# Patient Record
Sex: FEMALE | Race: WHITE | Employment: OTHER | URBAN - METROPOLITAN AREA
[De-identification: names, ages, dates, MRNs, and addresses within clinical notes are randomized per-mention and may not be internally consistent; named-entity substitution may affect disease eponyms.]

---

## 2017-03-27 ENCOUNTER — EMPLOYEE WELLNESS (OUTPATIENT)
Dept: OTHER | Age: 57
End: 2017-03-27

## 2018-03-20 VITALS — BODY MASS INDEX: 33.12 KG/M2 | WEIGHT: 199 LBS

## 2018-04-15 ENCOUNTER — HOSPITAL ENCOUNTER (EMERGENCY)
Age: 58
Discharge: HOME | End: 2018-04-15
Payer: COMMERCIAL

## 2018-04-15 ENCOUNTER — APPOINTMENT (OUTPATIENT)
Dept: GENERAL RADIOLOGY | Age: 58
End: 2018-04-15
Payer: COMMERCIAL

## 2018-04-15 VITALS
HEART RATE: 91 BPM | TEMPERATURE: 97.9 F | RESPIRATION RATE: 16 BRPM | DIASTOLIC BLOOD PRESSURE: 86 MMHG | WEIGHT: 210 LBS | SYSTOLIC BLOOD PRESSURE: 130 MMHG | OXYGEN SATURATION: 98 % | BODY MASS INDEX: 34.95 KG/M2

## 2018-04-15 DIAGNOSIS — G89.29 CHRONIC PAIN OF RIGHT KNEE: Primary | ICD-10-CM

## 2018-04-15 DIAGNOSIS — M25.561 CHRONIC PAIN OF RIGHT KNEE: Primary | ICD-10-CM

## 2018-04-15 DIAGNOSIS — M25.461 KNEE EFFUSION, RIGHT: ICD-10-CM

## 2018-04-15 PROCEDURE — 73564 X-RAY EXAM KNEE 4 OR MORE: CPT

## 2018-04-15 PROCEDURE — 73562 X-RAY EXAM OF KNEE 3: CPT

## 2018-04-15 PROCEDURE — 99212 OFFICE O/P EST SF 10 MIN: CPT

## 2018-04-17 ENCOUNTER — EMPLOYEE WELLNESS (OUTPATIENT)
Dept: OTHER | Age: 58
End: 2018-04-17

## 2018-04-17 LAB
CHOLESTEROL, TOTAL: 205 MG/DL (ref 0–199)
GLUCOSE BLD-MCNC: 98 MG/DL (ref 74–107)
HDLC SERPL-MCNC: 52 MG/DL
LDL CHOLESTEROL CALCULATED: 129 MG/DL (ref 0–99)
TRIGL SERPL-MCNC: 120 MG/DL (ref 0–149)

## 2018-04-23 VITALS — WEIGHT: 216 LBS | BODY MASS INDEX: 35.94 KG/M2

## 2018-04-26 ENCOUNTER — OFFICE VISIT (OUTPATIENT)
Dept: ORTHOPEDIC SURGERY | Age: 58
End: 2018-04-26
Payer: COMMERCIAL

## 2018-04-26 VITALS — HEIGHT: 65 IN | BODY MASS INDEX: 34.99 KG/M2 | TEMPERATURE: 97.9 F | WEIGHT: 210 LBS

## 2018-04-26 DIAGNOSIS — M17.11 PRIMARY OSTEOARTHRITIS OF RIGHT KNEE: Primary | ICD-10-CM

## 2018-04-26 PROCEDURE — 99204 OFFICE O/P NEW MOD 45 MIN: CPT | Performed by: ORTHOPAEDIC SURGERY

## 2018-04-26 RX ORDER — NAPROXEN AND ESOMEPRAZOLE MAGNESIUM 500; 20 MG/1; MG/1
1 TABLET, DELAYED RELEASE ORAL 2 TIMES DAILY
Qty: 60 TABLET | Refills: 3 | Status: ON HOLD | OUTPATIENT
Start: 2018-04-26 | End: 2018-05-02 | Stop reason: HOSPADM

## 2018-05-01 ENCOUNTER — HOSPITAL ENCOUNTER (INPATIENT)
Age: 58
LOS: 2 days | Discharge: HOME | DRG: 175 | End: 2018-05-03
Attending: EMERGENCY MEDICINE | Admitting: HOSPITALIST
Payer: COMMERCIAL

## 2018-05-01 ENCOUNTER — HOSPITAL ENCOUNTER (EMERGENCY)
Age: 58
Discharge: OTHER INSTITUTION | DRG: 175 | End: 2018-05-01
Payer: COMMERCIAL

## 2018-05-01 ENCOUNTER — APPOINTMENT (OUTPATIENT)
Dept: CT IMAGING | Age: 58
DRG: 175 | End: 2018-05-01
Payer: COMMERCIAL

## 2018-05-01 ENCOUNTER — APPOINTMENT (OUTPATIENT)
Dept: GENERAL RADIOLOGY | Age: 58
DRG: 175 | End: 2018-05-01
Payer: COMMERCIAL

## 2018-05-01 VITALS
BODY MASS INDEX: 34.61 KG/M2 | HEART RATE: 112 BPM | OXYGEN SATURATION: 97 % | RESPIRATION RATE: 20 BRPM | WEIGHT: 208 LBS | TEMPERATURE: 98.2 F | SYSTOLIC BLOOD PRESSURE: 111 MMHG | DIASTOLIC BLOOD PRESSURE: 82 MMHG

## 2018-05-01 DIAGNOSIS — I26.99 OTHER PULMONARY EMBOLISM WITHOUT ACUTE COR PULMONALE, UNSPECIFIED CHRONICITY (HCC): Primary | ICD-10-CM

## 2018-05-01 DIAGNOSIS — I82.431 ACUTE DEEP VEIN THROMBOSIS (DVT) OF POPLITEAL VEIN OF RIGHT LOWER EXTREMITY (HCC): ICD-10-CM

## 2018-05-01 DIAGNOSIS — R06.00 DYSPNEA, UNSPECIFIED TYPE: Primary | ICD-10-CM

## 2018-05-01 LAB
ANION GAP SERPL CALCULATED.3IONS-SCNC: 13 MMOL/L (ref 7–16)
BASOPHILS ABSOLUTE: 0.03 E9/L (ref 0–0.2)
BASOPHILS RELATIVE PERCENT: 0.4 % (ref 0–2)
BUN BLDV-MCNC: 13 MG/DL (ref 6–20)
CALCIUM SERPL-MCNC: 9.6 MG/DL (ref 8.6–10.2)
CHLORIDE BLD-SCNC: 103 MMOL/L (ref 98–107)
CO2: 23 MMOL/L (ref 22–29)
CREAT SERPL-MCNC: 0.8 MG/DL (ref 0.5–1)
D DIMER: 2793 NG/ML DDU
EOSINOPHILS ABSOLUTE: 0.07 E9/L (ref 0.05–0.5)
EOSINOPHILS RELATIVE PERCENT: 0.9 % (ref 0–6)
GFR AFRICAN AMERICAN: >60
GFR NON-AFRICAN AMERICAN: >60 ML/MIN/1.73
GLUCOSE BLD-MCNC: 114 MG/DL (ref 74–109)
HCT VFR BLD CALC: 38.8 % (ref 34–48)
HEMOGLOBIN: 12.9 G/DL (ref 11.5–15.5)
IMMATURE GRANULOCYTES #: 0.02 E9/L
IMMATURE GRANULOCYTES %: 0.2 % (ref 0–5)
LYMPHOCYTES ABSOLUTE: 1.78 E9/L (ref 1.5–4)
LYMPHOCYTES RELATIVE PERCENT: 22.2 % (ref 20–42)
MCH RBC QN AUTO: 30 PG (ref 26–35)
MCHC RBC AUTO-ENTMCNC: 33.2 % (ref 32–34.5)
MCV RBC AUTO: 90.2 FL (ref 80–99.9)
MONOCYTES ABSOLUTE: 0.69 E9/L (ref 0.1–0.95)
MONOCYTES RELATIVE PERCENT: 8.6 % (ref 2–12)
NEUTROPHILS ABSOLUTE: 5.42 E9/L (ref 1.8–7.3)
NEUTROPHILS RELATIVE PERCENT: 67.7 % (ref 43–80)
PDW BLD-RTO: 12.5 FL (ref 11.5–15)
PLATELET # BLD: 240 E9/L (ref 130–450)
PMV BLD AUTO: 10.4 FL (ref 7–12)
POTASSIUM SERPL-SCNC: 3.9 MMOL/L (ref 3.5–5)
PRO-BNP: 3163 PG/ML (ref 0–125)
RBC # BLD: 4.3 E12/L (ref 3.5–5.5)
SODIUM BLD-SCNC: 139 MMOL/L (ref 132–146)
TROPONIN: <0.01 NG/ML (ref 0–0.03)
WBC # BLD: 8 E9/L (ref 4.5–11.5)

## 2018-05-01 PROCEDURE — 6360000002 HC RX W HCPCS: Performed by: HOSPITALIST

## 2018-05-01 PROCEDURE — 71046 X-RAY EXAM CHEST 2 VIEWS: CPT

## 2018-05-01 PROCEDURE — 2060000000 HC ICU INTERMEDIATE R&B

## 2018-05-01 PROCEDURE — 84443 ASSAY THYROID STIM HORMONE: CPT

## 2018-05-01 PROCEDURE — 83880 ASSAY OF NATRIURETIC PEPTIDE: CPT

## 2018-05-01 PROCEDURE — 85025 COMPLETE CBC W/AUTO DIFF WBC: CPT

## 2018-05-01 PROCEDURE — 80048 BASIC METABOLIC PNL TOTAL CA: CPT

## 2018-05-01 PROCEDURE — 2580000003 HC RX 258: Performed by: HOSPITALIST

## 2018-05-01 PROCEDURE — 6360000002 HC RX W HCPCS: Performed by: EMERGENCY MEDICINE

## 2018-05-01 PROCEDURE — 6360000004 HC RX CONTRAST MEDICATION: Performed by: RADIOLOGY

## 2018-05-01 PROCEDURE — 99212 OFFICE O/P EST SF 10 MIN: CPT

## 2018-05-01 PROCEDURE — 84484 ASSAY OF TROPONIN QUANT: CPT

## 2018-05-01 PROCEDURE — 36415 COLL VENOUS BLD VENIPUNCTURE: CPT

## 2018-05-01 PROCEDURE — 85378 FIBRIN DEGRADE SEMIQUANT: CPT

## 2018-05-01 PROCEDURE — 71275 CT ANGIOGRAPHY CHEST: CPT

## 2018-05-01 PROCEDURE — 99285 EMERGENCY DEPT VISIT HI MDM: CPT

## 2018-05-01 RX ORDER — ONDANSETRON 2 MG/ML
4 INJECTION INTRAMUSCULAR; INTRAVENOUS EVERY 6 HOURS PRN
Status: DISCONTINUED | OUTPATIENT
Start: 2018-05-01 | End: 2018-05-03 | Stop reason: HOSPADM

## 2018-05-01 RX ORDER — SODIUM CHLORIDE 9 MG/ML
INJECTION, SOLUTION INTRAVENOUS CONTINUOUS
Status: DISCONTINUED | OUTPATIENT
Start: 2018-05-01 | End: 2018-05-02

## 2018-05-01 RX ORDER — SODIUM CHLORIDE 0.9 % (FLUSH) 0.9 %
10 SYRINGE (ML) INJECTION EVERY 12 HOURS SCHEDULED
Status: DISCONTINUED | OUTPATIENT
Start: 2018-05-01 | End: 2018-05-03 | Stop reason: HOSPADM

## 2018-05-01 RX ORDER — SODIUM CHLORIDE 0.9 % (FLUSH) 0.9 %
10 SYRINGE (ML) INJECTION PRN
Status: DISCONTINUED | OUTPATIENT
Start: 2018-05-01 | End: 2018-05-03 | Stop reason: HOSPADM

## 2018-05-01 RX ADMIN — ENOXAPARIN SODIUM 90 MG: 100 INJECTION SUBCUTANEOUS at 21:02

## 2018-05-01 RX ADMIN — Medication 10 ML: at 21:04

## 2018-05-01 RX ADMIN — ENOXAPARIN SODIUM 90 MG: 100 INJECTION SUBCUTANEOUS at 17:58

## 2018-05-01 RX ADMIN — IOPAMIDOL 80 ML: 755 INJECTION, SOLUTION INTRAVENOUS at 16:57

## 2018-05-01 RX ADMIN — SODIUM CHLORIDE: 9 INJECTION, SOLUTION INTRAVENOUS at 19:32

## 2018-05-01 ASSESSMENT — ENCOUNTER SYMPTOMS
COUGH: 0
SHORTNESS OF BREATH: 1
BACK PAIN: 0
WHEEZING: 0
ABDOMINAL PAIN: 0
DIARRHEA: 0
SPUTUM PRODUCTION: 0
VOMITING: 0
HEMOPTYSIS: 0
RHINORRHEA: 0

## 2018-05-02 ENCOUNTER — APPOINTMENT (OUTPATIENT)
Dept: INTERVENTIONAL RADIOLOGY/VASCULAR | Age: 58
DRG: 175 | End: 2018-05-02
Payer: COMMERCIAL

## 2018-05-02 PROBLEM — I82.431 DEEP VEIN THROMBOSIS (DVT) OF POPLITEAL VEIN OF RIGHT LOWER EXTREMITY (HCC): Status: ACTIVE | Noted: 2018-05-02

## 2018-05-02 LAB
ANION GAP SERPL CALCULATED.3IONS-SCNC: 14 MMOL/L (ref 7–16)
BUN BLDV-MCNC: 10 MG/DL (ref 6–20)
CALCIUM SERPL-MCNC: 8.8 MG/DL (ref 8.6–10.2)
CHLORIDE BLD-SCNC: 106 MMOL/L (ref 98–107)
CO2: 21 MMOL/L (ref 22–29)
CREAT SERPL-MCNC: 0.7 MG/DL (ref 0.5–1)
GFR AFRICAN AMERICAN: >60
GFR NON-AFRICAN AMERICAN: >60 ML/MIN/1.73
GLUCOSE BLD-MCNC: 95 MG/DL (ref 74–109)
HCT VFR BLD CALC: 36.9 % (ref 34–48)
HEMOGLOBIN: 12.3 G/DL (ref 11.5–15.5)
LV EF: 70 %
LVEF MODALITY: NORMAL
MAGNESIUM: 2.2 MG/DL (ref 1.6–2.6)
MCH RBC QN AUTO: 30.1 PG (ref 26–35)
MCHC RBC AUTO-ENTMCNC: 33.3 % (ref 32–34.5)
MCV RBC AUTO: 90.2 FL (ref 80–99.9)
PDW BLD-RTO: 12.6 FL (ref 11.5–15)
PLATELET # BLD: 245 E9/L (ref 130–450)
PMV BLD AUTO: 10.9 FL (ref 7–12)
POTASSIUM REFLEX MAGNESIUM: 3.5 MMOL/L (ref 3.5–5)
RBC # BLD: 4.09 E12/L (ref 3.5–5.5)
SODIUM BLD-SCNC: 141 MMOL/L (ref 132–146)
WBC # BLD: 7.9 E9/L (ref 4.5–11.5)

## 2018-05-02 PROCEDURE — 83516 IMMUNOASSAY NONANTIBODY: CPT

## 2018-05-02 PROCEDURE — 81240 F2 GENE: CPT

## 2018-05-02 PROCEDURE — 6360000002 HC RX W HCPCS: Performed by: HOSPITALIST

## 2018-05-02 PROCEDURE — 81241 F5 GENE: CPT

## 2018-05-02 PROCEDURE — 81291 MTHFR GENE: CPT

## 2018-05-02 PROCEDURE — 93306 TTE W/DOPPLER COMPLETE: CPT

## 2018-05-02 PROCEDURE — 2060000000 HC ICU INTERMEDIATE R&B

## 2018-05-02 PROCEDURE — 83090 ASSAY OF HOMOCYSTEINE: CPT

## 2018-05-02 PROCEDURE — 83735 ASSAY OF MAGNESIUM: CPT

## 2018-05-02 PROCEDURE — 2580000003 HC RX 258: Performed by: HOSPITALIST

## 2018-05-02 PROCEDURE — 36415 COLL VENOUS BLD VENIPUNCTURE: CPT

## 2018-05-02 PROCEDURE — 80048 BASIC METABOLIC PNL TOTAL CA: CPT

## 2018-05-02 PROCEDURE — 85027 COMPLETE CBC AUTOMATED: CPT

## 2018-05-02 PROCEDURE — 86038 ANTINUCLEAR ANTIBODIES: CPT

## 2018-05-02 PROCEDURE — 81400 MOPATH PROCEDURE LEVEL 1: CPT

## 2018-05-02 PROCEDURE — 85613 RUSSELL VIPER VENOM DILUTED: CPT

## 2018-05-02 PROCEDURE — 93970 EXTREMITY STUDY: CPT

## 2018-05-02 RX ADMIN — ENOXAPARIN SODIUM 90 MG: 100 INJECTION SUBCUTANEOUS at 08:45

## 2018-05-02 RX ADMIN — Medication 10 ML: at 20:18

## 2018-05-02 RX ADMIN — SODIUM CHLORIDE: 9 INJECTION, SOLUTION INTRAVENOUS at 06:08

## 2018-05-02 RX ADMIN — ENOXAPARIN SODIUM 90 MG: 100 INJECTION SUBCUTANEOUS at 20:15

## 2018-05-02 ASSESSMENT — PAIN SCALES - GENERAL
PAINLEVEL_OUTOF10: 0

## 2018-05-03 VITALS
TEMPERATURE: 99 F | SYSTOLIC BLOOD PRESSURE: 127 MMHG | OXYGEN SATURATION: 96 % | HEART RATE: 89 BPM | RESPIRATION RATE: 14 BRPM | HEIGHT: 65 IN | WEIGHT: 208 LBS | DIASTOLIC BLOOD PRESSURE: 79 MMHG | BODY MASS INDEX: 34.66 KG/M2

## 2018-05-03 LAB
ANTI-NUCLEAR ANTIBODY (ANA): NEGATIVE
HOMOCYSTEINE: 9.9 UMOL/L (ref 0–15)
LUPUS ANTICOAG DVVT: NORMAL
TSH SERPL DL<=0.05 MIU/L-ACNC: 4.04 UIU/ML (ref 0.27–4.2)

## 2018-05-03 PROCEDURE — 2580000003 HC RX 258: Performed by: HOSPITALIST

## 2018-05-03 PROCEDURE — 6360000002 HC RX W HCPCS: Performed by: HOSPITALIST

## 2018-05-03 RX ORDER — ACETAMINOPHEN AND CODEINE PHOSPHATE 300; 30 MG/1; MG/1
1 TABLET ORAL EVERY 6 HOURS PRN
Qty: 18 TABLET | Refills: 0 | Status: SHIPPED | OUTPATIENT
Start: 2018-05-03 | End: 2018-05-06

## 2018-05-03 RX ADMIN — Medication 10 ML: at 09:43

## 2018-05-03 RX ADMIN — ENOXAPARIN SODIUM 90 MG: 100 INJECTION SUBCUTANEOUS at 09:43

## 2018-05-03 ASSESSMENT — PAIN SCALES - GENERAL
PAINLEVEL_OUTOF10: 0
PAINLEVEL_OUTOF10: 0

## 2018-05-04 ENCOUNTER — CARE COORDINATION (OUTPATIENT)
Dept: CASE MANAGEMENT | Age: 58
End: 2018-05-04

## 2018-05-04 LAB
ANTIPHOSPHOLIPID AB IGG: 8 GPL (ref 0–14)
ANTIPHOSPHOLIPID AB IGM: 2 MPL (ref 0–14)

## 2018-05-07 ENCOUNTER — TELEPHONE (OUTPATIENT)
Dept: ORTHOPEDIC SURGERY | Age: 58
End: 2018-05-07

## 2018-05-08 ENCOUNTER — OFFICE VISIT (OUTPATIENT)
Dept: FAMILY MEDICINE CLINIC | Age: 58
End: 2018-05-08
Payer: COMMERCIAL

## 2018-05-08 VITALS
DIASTOLIC BLOOD PRESSURE: 80 MMHG | HEART RATE: 94 BPM | BODY MASS INDEX: 34.57 KG/M2 | RESPIRATION RATE: 16 BRPM | WEIGHT: 207.5 LBS | TEMPERATURE: 97.8 F | OXYGEN SATURATION: 98 % | SYSTOLIC BLOOD PRESSURE: 124 MMHG | HEIGHT: 65 IN

## 2018-05-08 DIAGNOSIS — I82.431 ACUTE DEEP VEIN THROMBOSIS (DVT) OF POPLITEAL VEIN OF RIGHT LOWER EXTREMITY (HCC): ICD-10-CM

## 2018-05-08 DIAGNOSIS — I26.99 PULMONARY EMBOLISM, BILATERAL (HCC): Primary | ICD-10-CM

## 2018-05-08 DIAGNOSIS — E78.00 HYPERCHOLESTEREMIA: ICD-10-CM

## 2018-05-08 DIAGNOSIS — E66.9 OBESITY (BMI 30.0-34.9): ICD-10-CM

## 2018-05-08 LAB
EKG ATRIAL RATE: 100 BPM
EKG P AXIS: 55 DEGREES
EKG P-R INTERVAL: 158 MS
EKG Q-T INTERVAL: 374 MS
EKG QRS DURATION: 76 MS
EKG QTC CALCULATION (BAZETT): 482 MS
EKG R AXIS: 2 DEGREES
EKG T AXIS: 5 DEGREES
EKG VENTRICULAR RATE: 100 BPM

## 2018-05-08 PROCEDURE — 99214 OFFICE O/P EST MOD 30 MIN: CPT | Performed by: FAMILY MEDICINE

## 2018-05-10 LAB — THROMBOPHILIA DNA ASSAY: NORMAL

## 2018-05-25 DIAGNOSIS — I26.99 PULMONARY EMBOLISM, BILATERAL (HCC): Primary | ICD-10-CM

## 2018-06-13 ENCOUNTER — OFFICE VISIT (OUTPATIENT)
Dept: ONCOLOGY | Age: 58
End: 2018-06-13
Payer: COMMERCIAL

## 2018-06-13 ENCOUNTER — HOSPITAL ENCOUNTER (OUTPATIENT)
Dept: INFUSION THERAPY | Age: 58
Discharge: HOME | End: 2018-06-13
Payer: COMMERCIAL

## 2018-06-13 VITALS
DIASTOLIC BLOOD PRESSURE: 90 MMHG | BODY MASS INDEX: 34.1 KG/M2 | HEIGHT: 65 IN | WEIGHT: 204.7 LBS | SYSTOLIC BLOOD PRESSURE: 155 MMHG | HEART RATE: 82 BPM | TEMPERATURE: 98 F | RESPIRATION RATE: 18 BRPM

## 2018-06-13 DIAGNOSIS — I82.431 DEEP VEIN THROMBOSIS (DVT) OF POPLITEAL VEIN OF RIGHT LOWER EXTREMITY, UNSPECIFIED CHRONICITY (HCC): ICD-10-CM

## 2018-06-13 DIAGNOSIS — I26.99 PULMONARY EMBOLISM, BILATERAL (HCC): ICD-10-CM

## 2018-06-13 DIAGNOSIS — I26.99 PULMONARY EMBOLISM, BILATERAL (HCC): Primary | ICD-10-CM

## 2018-06-13 LAB — D DIMER: <200 NG/ML DDU

## 2018-06-13 PROCEDURE — 99214 OFFICE O/P EST MOD 30 MIN: CPT | Performed by: INTERNAL MEDICINE

## 2018-06-13 PROCEDURE — 36415 COLL VENOUS BLD VENIPUNCTURE: CPT | Performed by: INTERNAL MEDICINE

## 2018-06-13 PROCEDURE — 85378 FIBRIN DEGRADE SEMIQUANT: CPT

## 2018-06-13 RX ORDER — FOLIC ACID 1 MG/1
1 TABLET ORAL DAILY
Qty: 90 TABLET | Refills: 3 | Status: SHIPPED | OUTPATIENT
Start: 2018-06-13 | End: 2019-04-22 | Stop reason: SDUPTHER

## 2018-07-09 ENCOUNTER — HOSPITAL ENCOUNTER (OUTPATIENT)
Age: 58
Discharge: HOME | End: 2018-07-09
Payer: COMMERCIAL

## 2018-07-09 LAB
CHOLESTEROL, TOTAL: 212 MG/DL (ref 0–199)
HDLC SERPL-MCNC: 45 MG/DL
LDL CHOLESTEROL CALCULATED: 136 MG/DL (ref 0–99)
TRIGL SERPL-MCNC: 155 MG/DL (ref 0–149)
VLDLC SERPL CALC-MCNC: 31 MG/DL

## 2018-07-09 PROCEDURE — 36415 COLL VENOUS BLD VENIPUNCTURE: CPT

## 2018-07-09 PROCEDURE — 80061 LIPID PANEL: CPT

## 2018-07-09 NOTE — PROGRESS NOTES
LDL CHOLESTEROL LEVEL STILL HIGH. LOW SALT,LOW CARB. AND LOW FAT DIET. CONTINUE CURRENT MEDICATIONS TAKING REGULARLY. REGULAR WALKING ADVISED. DISCUSS NEXT VISIT. PLEASE ACKNOWLEDGE RECEIPT OF INFORMATION BY REPLYING THE MESSAGE. THANKS.

## 2018-07-12 ENCOUNTER — OFFICE VISIT (OUTPATIENT)
Dept: FAMILY MEDICINE CLINIC | Age: 58
End: 2018-07-12
Payer: COMMERCIAL

## 2018-07-12 VITALS
SYSTOLIC BLOOD PRESSURE: 124 MMHG | OXYGEN SATURATION: 98 % | WEIGHT: 197 LBS | HEIGHT: 65 IN | BODY MASS INDEX: 32.82 KG/M2 | TEMPERATURE: 97.8 F | RESPIRATION RATE: 20 BRPM | HEART RATE: 66 BPM | DIASTOLIC BLOOD PRESSURE: 68 MMHG

## 2018-07-12 DIAGNOSIS — I82.431 ACUTE DEEP VEIN THROMBOSIS (DVT) OF POPLITEAL VEIN OF RIGHT LOWER EXTREMITY (HCC): ICD-10-CM

## 2018-07-12 DIAGNOSIS — E78.00 HYPERCHOLESTEREMIA: Primary | ICD-10-CM

## 2018-07-12 DIAGNOSIS — E66.9 OBESITY (BMI 30.0-34.9): ICD-10-CM

## 2018-07-12 DIAGNOSIS — Z12.31 VISIT FOR SCREENING MAMMOGRAM: ICD-10-CM

## 2018-07-12 PROCEDURE — 99213 OFFICE O/P EST LOW 20 MIN: CPT | Performed by: FAMILY MEDICINE

## 2018-07-12 NOTE — PATIENT INSTRUCTIONS
LOW SALT,LOW CARB. AND LOW FAT DIET. CONTINUE CURRENT MEDICATIONS TAKING REGULARLY. REGULAR WALKING ADVISED. ADVISED WEIGHT REDUCTION. FASTING FOR LAB WORK PRIOR TO NEXT VISIT. SCHEDULE PELVIC, BREAST AND RECTAL  EXAM. WITH MÓNICA VICTORIA. NEXT APPOINTMENT IN 3 MONTHS.

## 2018-07-12 NOTE — PROGRESS NOTES
hesitancy, or incontinence  Musculoskeletal:Ambulating well  Neurologic: no paralysis, paresis, paresthesia, seizures, tremors, or headaches  Hematologic/Lymphatic/Immunologic: no anemia, abnormal bleeding/bruising, fever, chills, night sweats, swollen glands, or unexplained weight loss  Endocrine: no heat or cold intolerance and no polyphagia, polydipsia, or polyuria        OBJECTIVE:     VS:  Wt Readings from Last 3 Encounters:   07/12/18 197 lb (89.4 kg)   06/13/18 204 lb 11.2 oz (92.9 kg)   05/08/18 207 lb 8 oz (94.1 kg)     Temp Readings from Last 3 Encounters:   07/12/18 97.8 °F (36.6 °C) (Temporal)   06/13/18 98 °F (36.7 °C) (Oral)   05/08/18 97.8 °F (36.6 °C) (Temporal)     BP Readings from Last 3 Encounters:   07/12/18 124/68   06/13/18 (!) 155/90   05/08/18 124/80        General appearance: Alert, Awake, Oriented times 3, no distress  Skin: Warm and dry  Head: Normocephalic. No masses, lesions or tenderness noted  Eyes: Conjunctivae appear normal. PERLE  Ears: External ears normal  Nose/Sinuses: Nares normal. Septum midline. Mucosa normal. No drainage  Oropharynx: Oropharynx clear with no exudate seen  Neck: Neck supple. No jugular venous distension, lymphadenopathy or thyromegaly Trachea midline  Lungs: Lungs clear to auscultation bilaterally. No ronchi, crackles or wheezes  Heart: S1 S2  Regular rate and rhythm. No rub, murmur or gallop  Abdomen: Abdomen soft, non-tender. BS normal. No masses, organomegaly  Extremities:Pedal pulses are normal.  Musculoskeletal: Muscular strength appears intact.  No joint effusion, tenderness, swelling or warmth  Neuro:  No focal motor or sensory deficits        ASSESSMENT     Patient Active Problem List    Diagnosis Date Noted    Lumbar strain 11/12/2013     Priority: High     Class: Acute    Obesity (BMI 30.0-34.9) 11/12/2013     Priority: High     Class: Chronic    Hypercholesteremia 06/13/2013     Priority: High     Class: Chronic    Deep vein thrombosis (DVT) of

## 2018-09-17 DIAGNOSIS — I82.431 DEEP VEIN THROMBOSIS (DVT) OF POPLITEAL VEIN OF RIGHT LOWER EXTREMITY, UNSPECIFIED CHRONICITY (HCC): ICD-10-CM

## 2018-09-17 DIAGNOSIS — I26.99 PULMONARY EMBOLISM, BILATERAL (HCC): ICD-10-CM

## 2018-09-28 ENCOUNTER — OFFICE VISIT (OUTPATIENT)
Dept: FAMILY MEDICINE CLINIC | Age: 58
End: 2018-09-28
Payer: COMMERCIAL

## 2018-09-28 VITALS
RESPIRATION RATE: 19 BRPM | BODY MASS INDEX: 30.23 KG/M2 | DIASTOLIC BLOOD PRESSURE: 72 MMHG | SYSTOLIC BLOOD PRESSURE: 122 MMHG | OXYGEN SATURATION: 97 % | WEIGHT: 181.44 LBS | TEMPERATURE: 98 F | HEIGHT: 65 IN | HEART RATE: 68 BPM

## 2018-09-28 DIAGNOSIS — I26.99 PULMONARY EMBOLISM, BILATERAL (HCC): ICD-10-CM

## 2018-09-28 DIAGNOSIS — Z01.419 WELL WOMAN EXAM: Primary | ICD-10-CM

## 2018-09-28 DIAGNOSIS — Z13.31 DEPRESSION SCREENING: ICD-10-CM

## 2018-09-28 DIAGNOSIS — E78.00 HYPERCHOLESTEREMIA: Primary | ICD-10-CM

## 2018-09-28 DIAGNOSIS — Z01.419 ENCOUNTER FOR WELL WOMAN EXAM: ICD-10-CM

## 2018-09-28 DIAGNOSIS — I82.431 ACUTE DEEP VEIN THROMBOSIS (DVT) OF POPLITEAL VEIN OF RIGHT LOWER EXTREMITY (HCC): ICD-10-CM

## 2018-09-28 PROCEDURE — 99396 PREV VISIT EST AGE 40-64: CPT | Performed by: NURSE PRACTITIONER

## 2018-09-28 ASSESSMENT — PATIENT HEALTH QUESTIONNAIRE - PHQ9
1. LITTLE INTEREST OR PLEASURE IN DOING THINGS: 0
2. FEELING DOWN, DEPRESSED OR HOPELESS: 0
SUM OF ALL RESPONSES TO PHQ9 QUESTIONS 1 & 2: 0
SUM OF ALL RESPONSES TO PHQ QUESTIONS 1-9: 0
SUM OF ALL RESPONSES TO PHQ QUESTIONS 1-9: 0

## 2018-09-28 ASSESSMENT — ENCOUNTER SYMPTOMS
COUGH: 0
WHEEZING: 0
VOMITING: 0
ABDOMINAL DISTENTION: 0
SHORTNESS OF BREATH: 0
BLOOD IN STOOL: 0
COLOR CHANGE: 0
DIARRHEA: 0
NAUSEA: 0
ABDOMINAL PAIN: 0
CONSTIPATION: 0
RECTAL PAIN: 0
ANAL BLEEDING: 0

## 2018-10-05 ENCOUNTER — HOSPITAL ENCOUNTER (OUTPATIENT)
Age: 58
Discharge: HOME | End: 2018-10-05
Payer: COMMERCIAL

## 2018-10-05 DIAGNOSIS — E78.00 HYPERCHOLESTEREMIA: ICD-10-CM

## 2018-10-05 LAB
CHOLESTEROL, TOTAL: 193 MG/DL (ref 0–199)
HDLC SERPL-MCNC: 53 MG/DL
LDL CHOLESTEROL CALCULATED: 116 MG/DL (ref 0–99)
TRIGL SERPL-MCNC: 118 MG/DL (ref 0–149)
VLDLC SERPL CALC-MCNC: 24 MG/DL

## 2018-10-05 PROCEDURE — 36415 COLL VENOUS BLD VENIPUNCTURE: CPT

## 2018-10-05 PROCEDURE — 80061 LIPID PANEL: CPT

## 2018-10-09 ENCOUNTER — HOSPITAL ENCOUNTER (OUTPATIENT)
Dept: GENERAL RADIOLOGY | Age: 58
Discharge: HOME | End: 2018-10-11
Payer: COMMERCIAL

## 2018-10-09 DIAGNOSIS — Z12.31 VISIT FOR SCREENING MAMMOGRAM: ICD-10-CM

## 2018-10-09 PROCEDURE — 77063 BREAST TOMOSYNTHESIS BI: CPT

## 2018-10-10 ENCOUNTER — TELEPHONE (OUTPATIENT)
Dept: ONCOLOGY | Age: 58
End: 2018-10-10

## 2018-10-10 NOTE — TELEPHONE ENCOUNTER
Call to patient in reference to her mammogram performed at [facility] on October 9, 2018. Instructed patient that the radiologist has recommended some additional breast imaging, in order to make a final determination/result. Informed her that a request for Rx has been faxed to the ordering physician. Once we receive the script a  from [facility] will contact her to schedule the additional imaging study/studies. Verbalizes understanding and is agreeable to proceed.        Pinkey Hamman, RN, BSN, [address]

## 2018-10-12 ENCOUNTER — OFFICE VISIT (OUTPATIENT)
Dept: FAMILY MEDICINE CLINIC | Age: 58
End: 2018-10-12
Payer: COMMERCIAL

## 2018-10-12 ENCOUNTER — TELEPHONE (OUTPATIENT)
Dept: FAMILY MEDICINE CLINIC | Age: 58
End: 2018-10-12

## 2018-10-12 VITALS
DIASTOLIC BLOOD PRESSURE: 78 MMHG | SYSTOLIC BLOOD PRESSURE: 122 MMHG | HEIGHT: 65 IN | OXYGEN SATURATION: 99 % | HEART RATE: 75 BPM | TEMPERATURE: 98.1 F | RESPIRATION RATE: 16 BRPM | WEIGHT: 187.8 LBS | BODY MASS INDEX: 31.29 KG/M2

## 2018-10-12 DIAGNOSIS — I82.431 ACUTE DEEP VEIN THROMBOSIS (DVT) OF POPLITEAL VEIN OF RIGHT LOWER EXTREMITY (HCC): ICD-10-CM

## 2018-10-12 DIAGNOSIS — E66.9 OBESITY (BMI 30.0-34.9): ICD-10-CM

## 2018-10-12 DIAGNOSIS — E78.00 HYPERCHOLESTEREMIA: Primary | ICD-10-CM

## 2018-10-12 PROCEDURE — 99213 OFFICE O/P EST LOW 20 MIN: CPT | Performed by: FAMILY MEDICINE

## 2018-10-12 NOTE — PROGRESS NOTES
OFFICE PROGRESS NOTE      SUBJECTIVE:        Patient ID:   Len Harris is a 59 y.o. female who presents for   Chief Complaint   Patient presents with    3 Month Follow-Up    Hyperlipidemia    Results     mammogram results and lab results   [facility]     cervical cancer screen due - pt has to make appt with ob/gyn           HPI:     FEELS GOOD. WATCHING DIET GOOD. WALKING FOR EXERCISE. TAKING MEDICATIONS REGULARLY. GOING FOR ADDITIONAL MAMMOGRAM VIEWS AS RECOMMENDED. DOES GO TO GYN FOR PELVIC, BREAST AND RECTAL  EXAM.         Prior to Admission medications    Medication Sig Start Date End Date Taking?  Authorizing Provider   rivaroxaban (XARELTO) 20 MG TABS tablet Take 1 tablet by mouth daily (with breakfast) 9/17/18 3/16/19 Yes FERNANDO Cordero CNP   folic acid (FOLVITE) 1 MG tablet Take 1 tablet by mouth daily 6/13/18 6/8/19 Yes FERNANDO Cordero CNP     Social History     Social History    Marital status: Single     Spouse name: N/A    Number of children: N/A    Years of education: N/A     Social History Main Topics    Smoking status: Never Smoker    Smokeless tobacco: Never Used    Alcohol use No    Drug use: No    Sexual activity: No     Other Topics Concern    None     Social History Narrative    None       I have reviewed Viviana's allergies, medications, problem list, medical, social and family history and have updated as needed in the electronic medical record  Review Of Systems:    Skin: no abnormal pigmentation, rash, scaling, itching, masses, hair or nail changes  Eyes: no blurring, diplopia, or eye pain  Ears/Nose/Throat: no hearing loss, tinnitus, vertigo, nosebleed, nasal congestion, rhinorrhea, sore throat  Respiratory: no cough, pleuritic chest pain, dyspnea, or wheezing  Cardiovascular: no chest pain, angina, dyspnea on exertion, orthopnea, PND, palpitations, or claudication  Gastrointestinal: no nausea,

## 2018-10-12 NOTE — PATIENT INSTRUCTIONS
LOW SALT,LOW CARB. AND LOW FAT DIET. CONTINUE CURRENT MEDICATIONS TAKING REGULARLY. REGULAR WALKING ADVISED. ADVISED WEIGHT REDUCTION. NEXT APPOINTMENT IN 3 MONTHS.

## 2018-10-28 PROBLEM — Z13.31 DEPRESSION SCREENING: Status: RESOLVED | Noted: 2018-09-28 | Resolved: 2018-10-28

## 2018-10-30 ENCOUNTER — TELEPHONE (OUTPATIENT)
Dept: ONCOLOGY | Age: 58
End: 2018-10-30

## 2018-11-08 ENCOUNTER — HOSPITAL ENCOUNTER (OUTPATIENT)
Dept: GENERAL RADIOLOGY | Age: 58
Discharge: HOME | End: 2018-11-10
Payer: COMMERCIAL

## 2018-11-08 ENCOUNTER — TELEPHONE (OUTPATIENT)
Dept: ONCOLOGY | Age: 58
End: 2018-11-08

## 2018-11-08 DIAGNOSIS — N63.10 BREAST MASS, RIGHT: ICD-10-CM

## 2018-11-08 PROCEDURE — 76642 ULTRASOUND BREAST LIMITED: CPT

## 2018-11-08 PROCEDURE — G0279 TOMOSYNTHESIS, MAMMO: HCPCS

## 2018-11-08 NOTE — TELEPHONE ENCOUNTER
Left message with Elliott Butcher to call back to confirm new appt. details. *Per Edwin Espinoza, we rescheduled her 11/14/18 appt. to 1/7/19. Patient needs to confirm.

## 2018-12-19 ENCOUNTER — OFFICE VISIT (OUTPATIENT)
Dept: FAMILY MEDICINE CLINIC | Age: 58
End: 2018-12-19
Payer: COMMERCIAL

## 2018-12-19 VITALS
RESPIRATION RATE: 19 BRPM | BODY MASS INDEX: 34.32 KG/M2 | WEIGHT: 206 LBS | TEMPERATURE: 97.7 F | DIASTOLIC BLOOD PRESSURE: 74 MMHG | SYSTOLIC BLOOD PRESSURE: 126 MMHG | HEART RATE: 71 BPM | HEIGHT: 65 IN | OXYGEN SATURATION: 98 %

## 2018-12-19 DIAGNOSIS — E78.00 HYPERCHOLESTEREMIA: Primary | ICD-10-CM

## 2018-12-19 DIAGNOSIS — E66.9 OBESITY (BMI 30.0-34.9): ICD-10-CM

## 2018-12-19 DIAGNOSIS — J01.00 ACUTE MAXILLARY SINUSITIS, RECURRENCE NOT SPECIFIED: ICD-10-CM

## 2018-12-19 PROCEDURE — 99214 OFFICE O/P EST MOD 30 MIN: CPT | Performed by: FAMILY MEDICINE

## 2018-12-19 RX ORDER — SULFAMETHOXAZOLE AND TRIMETHOPRIM 800; 160 MG/1; MG/1
1 TABLET ORAL 2 TIMES DAILY
Qty: 20 TABLET | Refills: 0 | Status: SHIPPED | OUTPATIENT
Start: 2018-12-19 | End: 2019-01-11 | Stop reason: SDUPTHER

## 2018-12-19 NOTE — PROGRESS NOTES
OFFICE PROGRESS NOTE      SUBJECTIVE:        Patient ID:   Yimi Chase is a 59 y.o. female who presents for   Chief Complaint   Patient presents with    Sinusitis    Cough           HPI:     Sinus Pain: Patient complains of congestion, facial pain and low grade fever. Symptoms include lightheadedness, nasal congestion and productive cough with  yellow colored sputum with no fever, chills, night sweats or weight loss. Onset of symptoms was 1 week ago, gradually worsening since that time. She is drinking moderate amounts of fluids. Past history is significant for no history of pneumonia or bronchitis. Patient is non-smoker  WATCHING DIET BUT NOT GOOD. NO  EXERCISE. TAKING MEDICATIONS REGULARLY. Prior to Admission medications    Medication Sig Start Date End Date Taking?  Authorizing Provider   rivaroxaban (XARELTO) 20 MG TABS tablet Take 1 tablet by mouth daily (with breakfast) 9/17/18 3/16/19 Yes FERNANDO Vanegas CNP   folic acid (FOLVITE) 1 MG tablet Take 1 tablet by mouth daily 6/13/18 6/8/19 Yes FERNANDO Vanegas CNP     Social History     Social History    Marital status: Single     Spouse name: N/A    Number of children: N/A    Years of education: N/A     Social History Main Topics    Smoking status: Never Smoker    Smokeless tobacco: Never Used    Alcohol use No    Drug use: No    Sexual activity: No     Other Topics Concern    None     Social History Narrative    None       I have reviewed Viviana's allergies, medications, problem list, medical, social and family history and have updated as needed in the electronic medical record  Review Of Systems:    Skin: no abnormal pigmentation, rash, scaling, itching, masses, hair or nail changes  Eyes: no blurring, diplopia, or eye pain  Ears/Nose/Throat: no hearing loss, tinnitus, vertigo, nosebleed, nasal congestion, rhinorrhea, sore throat  Respiratory: no cough, pleuritic chest pain,

## 2019-01-07 ENCOUNTER — OFFICE VISIT (OUTPATIENT)
Dept: ONCOLOGY | Age: 59
End: 2019-01-07
Payer: COMMERCIAL

## 2019-01-07 ENCOUNTER — HOSPITAL ENCOUNTER (OUTPATIENT)
Dept: INFUSION THERAPY | Age: 59
Discharge: HOME | End: 2019-01-07
Payer: COMMERCIAL

## 2019-01-07 VITALS
DIASTOLIC BLOOD PRESSURE: 102 MMHG | BODY MASS INDEX: 34.2 KG/M2 | HEART RATE: 88 BPM | WEIGHT: 205.3 LBS | SYSTOLIC BLOOD PRESSURE: 128 MMHG | HEIGHT: 65 IN | RESPIRATION RATE: 20 BRPM | TEMPERATURE: 98.1 F

## 2019-01-07 DIAGNOSIS — I26.99 PULMONARY EMBOLISM, BILATERAL (HCC): ICD-10-CM

## 2019-01-07 DIAGNOSIS — I82.431 DEEP VEIN THROMBOSIS (DVT) OF POPLITEAL VEIN OF RIGHT LOWER EXTREMITY, UNSPECIFIED CHRONICITY (HCC): ICD-10-CM

## 2019-01-07 DIAGNOSIS — I26.99 PULMONARY EMBOLISM, BILATERAL (HCC): Primary | ICD-10-CM

## 2019-01-07 LAB
ALBUMIN SERPL-MCNC: 4.6 G/DL (ref 3.5–5.2)
ALP BLD-CCNC: 100 U/L (ref 35–104)
ALT SERPL-CCNC: 14 U/L (ref 0–32)
ANION GAP SERPL CALCULATED.3IONS-SCNC: 14 MMOL/L (ref 7–16)
AST SERPL-CCNC: 19 U/L (ref 0–31)
BASOPHILS ABSOLUTE: 0.05 E9/L (ref 0–0.2)
BASOPHILS RELATIVE PERCENT: 0.7 % (ref 0–2)
BILIRUB SERPL-MCNC: 0.3 MG/DL (ref 0–1.2)
BUN BLDV-MCNC: 13 MG/DL (ref 8–23)
CALCIUM SERPL-MCNC: 9.9 MG/DL (ref 8.6–10.2)
CHLORIDE BLD-SCNC: 101 MMOL/L (ref 98–107)
CO2: 26 MMOL/L (ref 22–29)
CREAT SERPL-MCNC: 0.8 MG/DL (ref 0.5–1)
D DIMER: 213 NG/ML DDU
EOSINOPHILS ABSOLUTE: 0.28 E9/L (ref 0.05–0.5)
EOSINOPHILS RELATIVE PERCENT: 3.8 % (ref 0–6)
GFR AFRICAN AMERICAN: >60
GFR NON-AFRICAN AMERICAN: >60 ML/MIN/1.73
GLUCOSE BLD-MCNC: 100 MG/DL (ref 74–99)
HCT VFR BLD CALC: 41.2 % (ref 34–48)
HEMOGLOBIN: 13.9 G/DL (ref 11.5–15.5)
IMMATURE GRANULOCYTES #: 0.03 E9/L
IMMATURE GRANULOCYTES %: 0.4 % (ref 0–5)
LYMPHOCYTES ABSOLUTE: 2.4 E9/L (ref 1.5–4)
LYMPHOCYTES RELATIVE PERCENT: 32.5 % (ref 20–42)
MCH RBC QN AUTO: 31.3 PG (ref 26–35)
MCHC RBC AUTO-ENTMCNC: 33.7 % (ref 32–34.5)
MCV RBC AUTO: 92.8 FL (ref 80–99.9)
MONOCYTES ABSOLUTE: 0.53 E9/L (ref 0.1–0.95)
MONOCYTES RELATIVE PERCENT: 7.2 % (ref 2–12)
NEUTROPHILS ABSOLUTE: 4.09 E9/L (ref 1.8–7.3)
NEUTROPHILS RELATIVE PERCENT: 55.4 % (ref 43–80)
PDW BLD-RTO: 12.6 FL (ref 11.5–15)
PLATELET # BLD: 362 E9/L (ref 130–450)
PMV BLD AUTO: 10 FL (ref 7–12)
POTASSIUM SERPL-SCNC: 4.3 MMOL/L (ref 3.5–5)
RBC # BLD: 4.44 E12/L (ref 3.5–5.5)
SODIUM BLD-SCNC: 141 MMOL/L (ref 132–146)
TOTAL PROTEIN: 8 G/DL (ref 6.4–8.3)
WBC # BLD: 7.4 E9/L (ref 4.5–11.5)

## 2019-01-07 PROCEDURE — 36415 COLL VENOUS BLD VENIPUNCTURE: CPT

## 2019-01-07 PROCEDURE — 99212 OFFICE O/P EST SF 10 MIN: CPT | Performed by: INTERNAL MEDICINE

## 2019-01-07 PROCEDURE — 99213 OFFICE O/P EST LOW 20 MIN: CPT

## 2019-01-07 PROCEDURE — 85378 FIBRIN DEGRADE SEMIQUANT: CPT

## 2019-01-07 PROCEDURE — 85025 COMPLETE CBC W/AUTO DIFF WBC: CPT

## 2019-01-07 PROCEDURE — 80053 COMPREHEN METABOLIC PANEL: CPT

## 2019-01-11 ENCOUNTER — OFFICE VISIT (OUTPATIENT)
Dept: FAMILY MEDICINE CLINIC | Age: 59
End: 2019-01-11
Payer: COMMERCIAL

## 2019-01-11 VITALS
DIASTOLIC BLOOD PRESSURE: 72 MMHG | OXYGEN SATURATION: 98 % | WEIGHT: 198.8 LBS | RESPIRATION RATE: 20 BRPM | BODY MASS INDEX: 33.12 KG/M2 | SYSTOLIC BLOOD PRESSURE: 124 MMHG | HEIGHT: 65 IN | HEART RATE: 88 BPM | TEMPERATURE: 97.9 F

## 2019-01-11 DIAGNOSIS — J01.00 ACUTE MAXILLARY SINUSITIS, RECURRENCE NOT SPECIFIED: ICD-10-CM

## 2019-01-11 DIAGNOSIS — E66.9 OBESITY (BMI 30.0-34.9): ICD-10-CM

## 2019-01-11 DIAGNOSIS — E78.00 HYPERCHOLESTEREMIA: Primary | ICD-10-CM

## 2019-01-11 DIAGNOSIS — I82.431 ACUTE DEEP VEIN THROMBOSIS (DVT) OF POPLITEAL VEIN OF RIGHT LOWER EXTREMITY (HCC): ICD-10-CM

## 2019-01-11 PROCEDURE — 99214 OFFICE O/P EST MOD 30 MIN: CPT | Performed by: FAMILY MEDICINE

## 2019-01-11 RX ORDER — SULFAMETHOXAZOLE AND TRIMETHOPRIM 800; 160 MG/1; MG/1
1 TABLET ORAL 2 TIMES DAILY
Qty: 20 TABLET | Refills: 0 | Status: SHIPPED | OUTPATIENT
Start: 2019-01-11 | End: 2019-01-21 | Stop reason: ALTCHOICE

## 2019-01-14 ENCOUNTER — APPOINTMENT (OUTPATIENT)
Dept: CT IMAGING | Age: 59
End: 2019-01-14
Payer: COMMERCIAL

## 2019-01-14 ENCOUNTER — HOSPITAL ENCOUNTER (EMERGENCY)
Age: 59
Discharge: HOME | End: 2019-01-14
Attending: EMERGENCY MEDICINE
Payer: COMMERCIAL

## 2019-01-14 VITALS
OXYGEN SATURATION: 99 % | SYSTOLIC BLOOD PRESSURE: 135 MMHG | TEMPERATURE: 97.8 F | RESPIRATION RATE: 18 BRPM | WEIGHT: 199.38 LBS | DIASTOLIC BLOOD PRESSURE: 80 MMHG | HEART RATE: 81 BPM | HEIGHT: 65 IN | BODY MASS INDEX: 33.22 KG/M2

## 2019-01-14 DIAGNOSIS — R00.2 PALPITATIONS: ICD-10-CM

## 2019-01-14 DIAGNOSIS — R06.00 DYSPNEA, UNSPECIFIED TYPE: Primary | ICD-10-CM

## 2019-01-14 DIAGNOSIS — E83.42 HYPOMAGNESEMIA: ICD-10-CM

## 2019-01-14 LAB
ANION GAP SERPL CALCULATED.3IONS-SCNC: 10 MMOL/L (ref 7–16)
APTT: 31.2 SEC (ref 24.5–35.1)
BUN BLDV-MCNC: 8 MG/DL (ref 8–23)
CALCIUM SERPL-MCNC: 6.8 MG/DL (ref 8.6–10.2)
CHLORIDE BLD-SCNC: 107 MMOL/L (ref 98–107)
CO2: 17 MMOL/L (ref 22–29)
CO2: 25 MMOL/L (ref 22–29)
CREAT SERPL-MCNC: 0.7 MG/DL (ref 0.5–1)
EKG ATRIAL RATE: 80 BPM
EKG P AXIS: 52 DEGREES
EKG P-R INTERVAL: 178 MS
EKG Q-T INTERVAL: 388 MS
EKG QRS DURATION: 84 MS
EKG QTC CALCULATION (BAZETT): 447 MS
EKG R AXIS: 13 DEGREES
EKG T AXIS: 39 DEGREES
EKG VENTRICULAR RATE: 80 BPM
GFR AFRICAN AMERICAN: >60
GFR AFRICAN AMERICAN: >60
GFR NON-AFRICAN AMERICAN: 51 ML/MIN/1.73
GFR NON-AFRICAN AMERICAN: >60 ML/MIN/1.73
GLUCOSE BLD-MCNC: 73 MG/DL (ref 74–99)
GLUCOSE BLD-MCNC: 90 MG/DL (ref 74–99)
HCT VFR BLD CALC: 41.4 % (ref 34–48)
HEMOGLOBIN: 13.7 G/DL (ref 11.5–15.5)
INR BLD: 1.3
MAGNESIUM: 1.5 MG/DL (ref 1.6–2.6)
MCH RBC QN AUTO: 30.2 PG (ref 26–35)
MCHC RBC AUTO-ENTMCNC: 33.1 % (ref 32–34.5)
MCV RBC AUTO: 91.4 FL (ref 80–99.9)
PDW BLD-RTO: 12.4 FL (ref 11.5–15)
PLATELET # BLD: 340 E9/L (ref 130–450)
PMV BLD AUTO: 10 FL (ref 7–12)
POC ANION GAP: 10 MMOL/L (ref 7–16)
POC BUN: 10 MG/DL (ref 8–23)
POC CHLORIDE: 103 MMOL/L (ref 100–108)
POC CREATININE: 1.1 MG/DL (ref 0.5–1)
POC POTASSIUM: 4.3 MMOL/L (ref 3.5–5)
POC SODIUM: 138 MMOL/L (ref 132–146)
POTASSIUM SERPL-SCNC: 5.1 MMOL/L (ref 3.5–5)
PRO-BNP: 76 PG/ML (ref 0–125)
PROTHROMBIN TIME: 14.6 SEC (ref 9.3–12.4)
RBC # BLD: 4.53 E12/L (ref 3.5–5.5)
SODIUM BLD-SCNC: 134 MMOL/L (ref 132–146)
TROPONIN: <0.01 NG/ML (ref 0–0.03)
WBC # BLD: 7.4 E9/L (ref 4.5–11.5)

## 2019-01-14 PROCEDURE — 82565 ASSAY OF CREATININE: CPT

## 2019-01-14 PROCEDURE — 85027 COMPLETE CBC AUTOMATED: CPT

## 2019-01-14 PROCEDURE — 36415 COLL VENOUS BLD VENIPUNCTURE: CPT

## 2019-01-14 PROCEDURE — 6370000000 HC RX 637 (ALT 250 FOR IP): Performed by: PHYSICIAN ASSISTANT

## 2019-01-14 PROCEDURE — 80051 ELECTROLYTE PANEL: CPT

## 2019-01-14 PROCEDURE — 6360000004 HC RX CONTRAST MEDICATION: Performed by: RADIOLOGY

## 2019-01-14 PROCEDURE — 85610 PROTHROMBIN TIME: CPT

## 2019-01-14 PROCEDURE — 71275 CT ANGIOGRAPHY CHEST: CPT

## 2019-01-14 PROCEDURE — 84484 ASSAY OF TROPONIN QUANT: CPT

## 2019-01-14 PROCEDURE — 83735 ASSAY OF MAGNESIUM: CPT

## 2019-01-14 PROCEDURE — 99285 EMERGENCY DEPT VISIT HI MDM: CPT

## 2019-01-14 PROCEDURE — 84520 ASSAY OF UREA NITROGEN: CPT

## 2019-01-14 PROCEDURE — 82947 ASSAY GLUCOSE BLOOD QUANT: CPT

## 2019-01-14 PROCEDURE — 83880 ASSAY OF NATRIURETIC PEPTIDE: CPT

## 2019-01-14 PROCEDURE — 80048 BASIC METABOLIC PNL TOTAL CA: CPT

## 2019-01-14 PROCEDURE — 85730 THROMBOPLASTIN TIME PARTIAL: CPT

## 2019-01-14 RX ORDER — ALBUTEROL SULFATE 90 UG/1
2 AEROSOL, METERED RESPIRATORY (INHALATION) EVERY 4 HOURS PRN
Qty: 1 INHALER | Refills: 1 | Status: SHIPPED | OUTPATIENT
Start: 2019-01-14 | End: 2020-02-11 | Stop reason: ALTCHOICE

## 2019-01-14 RX ADMIN — Medication 400 MG: at 21:22

## 2019-01-14 RX ADMIN — IOPAMIDOL 80 ML: 755 INJECTION, SOLUTION INTRAVENOUS at 19:54

## 2019-01-16 ENCOUNTER — HOSPITAL ENCOUNTER (OUTPATIENT)
Dept: NON INVASIVE DIAGNOSTICS | Age: 59
Discharge: HOME | End: 2019-01-16
Payer: COMMERCIAL

## 2019-01-16 DIAGNOSIS — I26.99 PULMONARY EMBOLISM, BILATERAL (HCC): ICD-10-CM

## 2019-01-16 LAB
LV EF: 67 %
LVEF MODALITY: NORMAL

## 2019-01-16 PROCEDURE — 93306 TTE W/DOPPLER COMPLETE: CPT

## 2019-01-21 ENCOUNTER — OFFICE VISIT (OUTPATIENT)
Dept: FAMILY MEDICINE CLINIC | Age: 59
End: 2019-01-21
Payer: COMMERCIAL

## 2019-01-21 VITALS
TEMPERATURE: 97.4 F | RESPIRATION RATE: 20 BRPM | DIASTOLIC BLOOD PRESSURE: 68 MMHG | BODY MASS INDEX: 32.65 KG/M2 | HEART RATE: 58 BPM | HEIGHT: 65 IN | OXYGEN SATURATION: 97 % | SYSTOLIC BLOOD PRESSURE: 102 MMHG | WEIGHT: 196 LBS

## 2019-01-21 DIAGNOSIS — E66.9 OBESITY (BMI 30.0-34.9): ICD-10-CM

## 2019-01-21 DIAGNOSIS — J01.00 ACUTE MAXILLARY SINUSITIS, RECURRENCE NOT SPECIFIED: Primary | ICD-10-CM

## 2019-01-21 DIAGNOSIS — E78.00 HYPERCHOLESTEREMIA: ICD-10-CM

## 2019-01-21 PROCEDURE — 99213 OFFICE O/P EST LOW 20 MIN: CPT | Performed by: FAMILY MEDICINE

## 2019-01-21 RX ORDER — TOBRAMYCIN 3 MG/ML
SOLUTION/ DROPS OPHTHALMIC
Refills: 0 | COMMUNITY
Start: 2019-01-19 | End: 2020-02-11 | Stop reason: ALTCHOICE

## 2019-01-21 RX ORDER — AZITHROMYCIN 250 MG/1
250 TABLET, FILM COATED ORAL DAILY
Qty: 6 TABLET | Refills: 0 | Status: SHIPPED | OUTPATIENT
Start: 2019-01-21 | End: 2019-03-27

## 2019-01-24 ENCOUNTER — OFFICE VISIT (OUTPATIENT)
Dept: FAMILY MEDICINE CLINIC | Age: 59
End: 2019-01-24
Payer: COMMERCIAL

## 2019-01-24 VITALS
DIASTOLIC BLOOD PRESSURE: 78 MMHG | BODY MASS INDEX: 32.49 KG/M2 | TEMPERATURE: 97 F | SYSTOLIC BLOOD PRESSURE: 120 MMHG | HEART RATE: 68 BPM | RESPIRATION RATE: 16 BRPM | WEIGHT: 195 LBS | OXYGEN SATURATION: 98 % | HEIGHT: 65 IN

## 2019-01-24 DIAGNOSIS — I51.7 MILD CONCENTRIC LEFT VENTRICULAR HYPERTROPHY (LVH): ICD-10-CM

## 2019-01-24 DIAGNOSIS — R09.81 NASAL CONGESTION: ICD-10-CM

## 2019-01-24 DIAGNOSIS — E78.00 HYPERCHOLESTEREMIA: Primary | ICD-10-CM

## 2019-01-24 DIAGNOSIS — E66.9 OBESITY (BMI 30.0-34.9): ICD-10-CM

## 2019-01-24 PROCEDURE — 99213 OFFICE O/P EST LOW 20 MIN: CPT | Performed by: FAMILY MEDICINE

## 2019-01-28 ENCOUNTER — OFFICE VISIT (OUTPATIENT)
Dept: ONCOLOGY | Age: 59
End: 2019-01-28
Payer: COMMERCIAL

## 2019-01-28 ENCOUNTER — HOSPITAL ENCOUNTER (OUTPATIENT)
Dept: INFUSION THERAPY | Age: 59
Discharge: HOME | End: 2019-01-28
Payer: COMMERCIAL

## 2019-01-28 VITALS
BODY MASS INDEX: 32.4 KG/M2 | RESPIRATION RATE: 20 BRPM | HEART RATE: 99 BPM | WEIGHT: 194.5 LBS | DIASTOLIC BLOOD PRESSURE: 88 MMHG | TEMPERATURE: 99 F | SYSTOLIC BLOOD PRESSURE: 139 MMHG | HEIGHT: 65 IN

## 2019-01-28 DIAGNOSIS — I82.431 DEEP VEIN THROMBOSIS (DVT) OF POPLITEAL VEIN OF RIGHT LOWER EXTREMITY, UNSPECIFIED CHRONICITY (HCC): ICD-10-CM

## 2019-01-28 DIAGNOSIS — I26.99 PULMONARY EMBOLISM, BILATERAL (HCC): Primary | ICD-10-CM

## 2019-01-28 PROCEDURE — 99212 OFFICE O/P EST SF 10 MIN: CPT | Performed by: INTERNAL MEDICINE

## 2019-01-28 RX ORDER — ALPRAZOLAM 0.5 MG/1
0.5 TABLET ORAL 2 TIMES DAILY PRN
Qty: 60 TABLET | Refills: 0 | Status: SHIPPED | OUTPATIENT
Start: 2019-01-28 | End: 2019-02-27

## 2019-02-26 ENCOUNTER — OFFICE VISIT (OUTPATIENT)
Dept: FAMILY MEDICINE CLINIC | Age: 59
End: 2019-02-26
Payer: COMMERCIAL

## 2019-02-26 VITALS
TEMPERATURE: 97.7 F | SYSTOLIC BLOOD PRESSURE: 120 MMHG | DIASTOLIC BLOOD PRESSURE: 62 MMHG | HEART RATE: 78 BPM | BODY MASS INDEX: 33.82 KG/M2 | HEIGHT: 65 IN | WEIGHT: 203 LBS | OXYGEN SATURATION: 98 % | RESPIRATION RATE: 16 BRPM

## 2019-02-26 DIAGNOSIS — Z13.31 SCREENING FOR DEPRESSION: ICD-10-CM

## 2019-02-26 DIAGNOSIS — E78.00 HYPERCHOLESTEREMIA: ICD-10-CM

## 2019-02-26 DIAGNOSIS — J06.9 VIRAL URI: Primary | ICD-10-CM

## 2019-02-26 DIAGNOSIS — E66.9 OBESITY (BMI 30.0-34.9): ICD-10-CM

## 2019-02-26 PROCEDURE — G0444 DEPRESSION SCREEN ANNUAL: HCPCS | Performed by: FAMILY MEDICINE

## 2019-02-26 PROCEDURE — 99213 OFFICE O/P EST LOW 20 MIN: CPT | Performed by: FAMILY MEDICINE

## 2019-02-26 RX ORDER — MOXIFLOXACIN HCL 0.5 %
DROPS, VISCOUS (ML) OPHTHALMIC (EYE)
Refills: 0 | COMMUNITY
Start: 2019-01-29 | End: 2020-02-11 | Stop reason: ALTCHOICE

## 2019-02-26 ASSESSMENT — PATIENT HEALTH QUESTIONNAIRE - PHQ9
2. FEELING DOWN, DEPRESSED OR HOPELESS: 0
SUM OF ALL RESPONSES TO PHQ9 QUESTIONS 1 & 2: 0
SUM OF ALL RESPONSES TO PHQ QUESTIONS 1-9: 0
SUM OF ALL RESPONSES TO PHQ QUESTIONS 1-9: 0
1. LITTLE INTEREST OR PLEASURE IN DOING THINGS: 0

## 2019-03-27 ENCOUNTER — OFFICE VISIT (OUTPATIENT)
Dept: FAMILY MEDICINE CLINIC | Age: 59
End: 2019-03-27
Payer: COMMERCIAL

## 2019-03-27 VITALS
SYSTOLIC BLOOD PRESSURE: 118 MMHG | TEMPERATURE: 98.7 F | OXYGEN SATURATION: 97 % | WEIGHT: 208.5 LBS | HEIGHT: 65 IN | BODY MASS INDEX: 34.74 KG/M2 | DIASTOLIC BLOOD PRESSURE: 64 MMHG | RESPIRATION RATE: 17 BRPM | HEART RATE: 87 BPM

## 2019-03-27 DIAGNOSIS — E78.00 HYPERCHOLESTEREMIA: ICD-10-CM

## 2019-03-27 DIAGNOSIS — I26.99 PULMONARY EMBOLISM, BILATERAL (HCC): Primary | ICD-10-CM

## 2019-03-27 DIAGNOSIS — E66.9 OBESITY (BMI 30.0-34.9): ICD-10-CM

## 2019-03-27 PROCEDURE — 99213 OFFICE O/P EST LOW 20 MIN: CPT | Performed by: FAMILY MEDICINE

## 2019-03-27 RX ORDER — MONTELUKAST SODIUM 10 MG/1
10 TABLET ORAL DAILY
Qty: 30 TABLET | Refills: 3 | Status: SHIPPED
Start: 2019-03-27 | End: 2020-02-11 | Stop reason: ALTCHOICE

## 2019-03-28 ENCOUNTER — NURSE ONLY (OUTPATIENT)
Dept: FAMILY MEDICINE CLINIC | Age: 59
End: 2019-03-28
Payer: COMMERCIAL

## 2019-03-28 DIAGNOSIS — R06.02 SOB (SHORTNESS OF BREATH): Primary | ICD-10-CM

## 2019-03-28 PROCEDURE — 94010 BREATHING CAPACITY TEST: CPT | Performed by: FAMILY MEDICINE

## 2019-04-01 DIAGNOSIS — I26.99 PULMONARY EMBOLISM, BILATERAL (HCC): ICD-10-CM

## 2019-04-01 DIAGNOSIS — I82.431 DEEP VEIN THROMBOSIS (DVT) OF POPLITEAL VEIN OF RIGHT LOWER EXTREMITY, UNSPECIFIED CHRONICITY (HCC): ICD-10-CM

## 2019-04-22 DIAGNOSIS — I82.431 DEEP VEIN THROMBOSIS (DVT) OF POPLITEAL VEIN OF RIGHT LOWER EXTREMITY, UNSPECIFIED CHRONICITY (HCC): ICD-10-CM

## 2019-04-22 DIAGNOSIS — I26.99 PULMONARY EMBOLISM, BILATERAL (HCC): ICD-10-CM

## 2019-04-22 RX ORDER — FOLIC ACID 1 MG/1
1 TABLET ORAL DAILY
Qty: 90 TABLET | Refills: 3 | Status: SHIPPED | OUTPATIENT
Start: 2019-04-22 | End: 2020-01-07 | Stop reason: SDUPTHER

## 2019-04-24 ENCOUNTER — EMPLOYEE WELLNESS (OUTPATIENT)
Dept: OTHER | Age: 59
End: 2019-04-24

## 2019-04-24 LAB
CHOLESTEROL, TOTAL: 194 MG/DL (ref 0–199)
GLUCOSE BLD-MCNC: 92 MG/DL (ref 74–107)
HDLC SERPL-MCNC: 61 MG/DL
LDL CHOLESTEROL CALCULATED: 94 MG/DL (ref 0–99)
TRIGL SERPL-MCNC: 195 MG/DL (ref 0–149)

## 2019-04-29 VITALS — BODY MASS INDEX: 35.61 KG/M2 | WEIGHT: 214 LBS

## 2019-09-11 ENCOUNTER — OFFICE VISIT (OUTPATIENT)
Dept: ONCOLOGY | Age: 59
End: 2019-09-11
Payer: COMMERCIAL

## 2019-09-11 ENCOUNTER — HOSPITAL ENCOUNTER (OUTPATIENT)
Dept: INFUSION THERAPY | Age: 59
Discharge: HOME | End: 2019-09-11
Payer: COMMERCIAL

## 2019-09-11 VITALS
DIASTOLIC BLOOD PRESSURE: 81 MMHG | HEIGHT: 65 IN | HEART RATE: 89 BPM | TEMPERATURE: 97.1 F | BODY MASS INDEX: 33.61 KG/M2 | WEIGHT: 201.7 LBS | SYSTOLIC BLOOD PRESSURE: 131 MMHG | OXYGEN SATURATION: 98 %

## 2019-09-11 DIAGNOSIS — I26.99 PULMONARY EMBOLISM, BILATERAL (HCC): ICD-10-CM

## 2019-09-11 DIAGNOSIS — D68.59 THROMBOPHILIA (HCC): Primary | ICD-10-CM

## 2019-09-11 DIAGNOSIS — I82.431 DEEP VEIN THROMBOSIS (DVT) OF POPLITEAL VEIN OF RIGHT LOWER EXTREMITY, UNSPECIFIED CHRONICITY (HCC): ICD-10-CM

## 2019-09-11 LAB
ALBUMIN SERPL-MCNC: 4.3 G/DL (ref 3.5–5.2)
ALP BLD-CCNC: 97 U/L (ref 35–104)
ALT SERPL-CCNC: 13 U/L (ref 0–32)
ANION GAP SERPL CALCULATED.3IONS-SCNC: 13 MMOL/L (ref 7–16)
AST SERPL-CCNC: 15 U/L (ref 0–31)
BASOPHILS ABSOLUTE: 0.04 E9/L (ref 0–0.2)
BASOPHILS RELATIVE PERCENT: 0.5 % (ref 0–2)
BILIRUB SERPL-MCNC: 0.3 MG/DL (ref 0–1.2)
BUN BLDV-MCNC: 18 MG/DL (ref 8–23)
CALCIUM SERPL-MCNC: 9.5 MG/DL (ref 8.6–10.2)
CHLORIDE BLD-SCNC: 105 MMOL/L (ref 98–107)
CO2: 25 MMOL/L (ref 22–29)
CREAT SERPL-MCNC: 0.8 MG/DL (ref 0.5–1)
EOSINOPHILS ABSOLUTE: 0.39 E9/L (ref 0.05–0.5)
EOSINOPHILS RELATIVE PERCENT: 5.2 % (ref 0–6)
GFR AFRICAN AMERICAN: >60
GFR NON-AFRICAN AMERICAN: >60 ML/MIN/1.73
GLUCOSE BLD-MCNC: 114 MG/DL (ref 74–99)
HCT VFR BLD CALC: 39.3 % (ref 34–48)
HEMOGLOBIN: 12.6 G/DL (ref 11.5–15.5)
IMMATURE GRANULOCYTES #: 0.02 E9/L
IMMATURE GRANULOCYTES %: 0.3 % (ref 0–5)
LYMPHOCYTES ABSOLUTE: 2.17 E9/L (ref 1.5–4)
LYMPHOCYTES RELATIVE PERCENT: 29.2 % (ref 20–42)
MCH RBC QN AUTO: 29.6 PG (ref 26–35)
MCHC RBC AUTO-ENTMCNC: 32.1 % (ref 32–34.5)
MCV RBC AUTO: 92.5 FL (ref 80–99.9)
MONOCYTES ABSOLUTE: 0.64 E9/L (ref 0.1–0.95)
MONOCYTES RELATIVE PERCENT: 8.6 % (ref 2–12)
NEUTROPHILS ABSOLUTE: 4.17 E9/L (ref 1.8–7.3)
NEUTROPHILS RELATIVE PERCENT: 56.2 % (ref 43–80)
PDW BLD-RTO: 13.4 FL (ref 11.5–15)
PLATELET # BLD: 316 E9/L (ref 130–450)
PMV BLD AUTO: 10.4 FL (ref 7–12)
POTASSIUM SERPL-SCNC: 4.3 MMOL/L (ref 3.5–5)
RBC # BLD: 4.25 E12/L (ref 3.5–5.5)
SODIUM BLD-SCNC: 143 MMOL/L (ref 132–146)
TOTAL PROTEIN: 7 G/DL (ref 6.4–8.3)
WBC # BLD: 7.4 E9/L (ref 4.5–11.5)

## 2019-09-11 PROCEDURE — 85025 COMPLETE CBC W/AUTO DIFF WBC: CPT

## 2019-09-11 PROCEDURE — 80053 COMPREHEN METABOLIC PANEL: CPT

## 2019-09-11 PROCEDURE — 36415 COLL VENOUS BLD VENIPUNCTURE: CPT

## 2019-09-11 PROCEDURE — 99212 OFFICE O/P EST SF 10 MIN: CPT

## 2019-09-11 NOTE — PROGRESS NOTES
Meds:  Continuous Infusions:  PRN Meds:.        Recent Laboratory Data-     Lab Results   Component Value Date    WBC 7.4 01/14/2019    HGB 13.7 01/14/2019    HCT 41.4 01/14/2019    MCV 91.4 01/14/2019     01/14/2019    LYMPHOPCT 32.5 01/07/2019    RBC 4.53 01/14/2019    MCH 30.2 01/14/2019    MCHC 33.1 01/14/2019    RDW 12.4 01/14/2019    NEUTOPHILPCT 55.4 01/07/2019    MONOPCT 7.2 01/07/2019    BASOPCT 0.7 01/07/2019    NEUTROABS 4.09 01/07/2019    LYMPHSABS 2.40 01/07/2019    MONOSABS 0.53 01/07/2019    EOSABS 0.28 01/07/2019    BASOSABS 0.05 01/07/2019       Lab Results   Component Value Date     01/14/2019    K 5.1 (H) 01/14/2019     01/14/2019    CO2 17 (L) 01/14/2019    BUN 8 01/14/2019    CREATININE 0.7 01/14/2019    GLUCOSE 92 04/24/2019    CALCIUM 6.8 (L) 01/14/2019    PROT 8.0 01/07/2019    LABALBU 4.6 01/07/2019    BILITOT 0.3 01/07/2019    ALKPHOS 100 01/07/2019    AST 19 01/07/2019    ALT 14 01/07/2019    LABGLOM >60 01/14/2019    GFRAA >60 01/14/2019              Ref. Range 5/2/2018 19:14 5/2/2018 19:15 6/13/2018 12:30 1/7/2019 14:27   D-Dimer, Quant Latest Units: ng/mL DDU   <200 213           Radiology-  No recent results. ASSESSMENT/PLAN :  A 66-year-old woman previously healthy with recent diagnosis of extensive bilateral pulmonary emboli and DVT of the right lower extremity which appears to be unprovoked  Her only risk factor is her obesity  She was initially treated with therapeutic dose of Lovenox 90 mg subcu twice a day and her dyspnea has significantly improved , She was then transitioned to oral Xarelto      Her antiphospholipid antibody titers was negative for with negative lupus anticoagulant and DRVVT.   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     She has a strong family history of miscarriages TIAs and dementia.     The fact that she had extensive

## 2020-01-07 RX ORDER — FOLIC ACID 1 MG/1
1 TABLET ORAL DAILY
Qty: 90 TABLET | Refills: 3 | Status: SHIPPED | OUTPATIENT
Start: 2020-01-07 | End: 2021-03-26 | Stop reason: SDUPTHER

## 2020-01-13 ENCOUNTER — HOSPITAL ENCOUNTER (OUTPATIENT)
Age: 60
Discharge: HOME | End: 2020-01-15
Payer: COMMERCIAL

## 2020-01-13 PROCEDURE — 87624 HPV HI-RISK TYP POOLED RSLT: CPT

## 2020-01-13 PROCEDURE — G0123 SCREEN CERV/VAG THIN LAYER: HCPCS

## 2020-01-19 LAB
HPV SAMPLE: NORMAL
HPV TYPE 16: NOT DETECTED
HPV TYPE 18: NOT DETECTED
HPV, HIGH RISK OTHER: NOT DETECTED
INTERPRETATION: NORMAL
SOURCE: NORMAL

## 2020-01-21 ENCOUNTER — HOSPITAL ENCOUNTER (OUTPATIENT)
Dept: GENERAL RADIOLOGY | Age: 60
Discharge: HOME | End: 2020-01-23
Payer: COMMERCIAL

## 2020-01-21 PROCEDURE — 77063 BREAST TOMOSYNTHESIS BI: CPT

## 2020-01-27 ENCOUNTER — TELEPHONE (OUTPATIENT)
Dept: ADMINISTRATIVE | Age: 60
End: 2020-01-27

## 2020-02-03 ENCOUNTER — HOSPITAL ENCOUNTER (OUTPATIENT)
Age: 60
Discharge: HOME | End: 2020-02-03
Payer: COMMERCIAL

## 2020-02-03 LAB
ALBUMIN SERPL-MCNC: 4.5 G/DL (ref 3.5–5.2)
ALP BLD-CCNC: 108 U/L (ref 35–104)
ALT SERPL-CCNC: 12 U/L (ref 0–32)
ANION GAP SERPL CALCULATED.3IONS-SCNC: 17 MMOL/L (ref 7–16)
AST SERPL-CCNC: 16 U/L (ref 0–31)
BASOPHILS ABSOLUTE: 0.03 E9/L (ref 0–0.2)
BASOPHILS RELATIVE PERCENT: 0.5 % (ref 0–2)
BILIRUB SERPL-MCNC: 0.5 MG/DL (ref 0–1.2)
BUN BLDV-MCNC: 10 MG/DL (ref 8–23)
CALCIUM SERPL-MCNC: 10.2 MG/DL (ref 8.6–10.2)
CHLORIDE BLD-SCNC: 105 MMOL/L (ref 98–107)
CHOLESTEROL, TOTAL: 195 MG/DL (ref 0–199)
CO2: 21 MMOL/L (ref 22–29)
CREAT SERPL-MCNC: 0.9 MG/DL (ref 0.5–1)
EOSINOPHILS ABSOLUTE: 0.27 E9/L (ref 0.05–0.5)
EOSINOPHILS RELATIVE PERCENT: 4.7 % (ref 0–6)
GFR AFRICAN AMERICAN: >60
GFR NON-AFRICAN AMERICAN: >60 ML/MIN/1.73
GLUCOSE BLD-MCNC: 102 MG/DL (ref 74–99)
HCT VFR BLD CALC: 44.5 % (ref 34–48)
HDLC SERPL-MCNC: 54 MG/DL
HEMOGLOBIN: 14 G/DL (ref 11.5–15.5)
IMMATURE GRANULOCYTES #: 0.02 E9/L
IMMATURE GRANULOCYTES %: 0.3 % (ref 0–5)
LDL CHOLESTEROL CALCULATED: 114 MG/DL (ref 0–99)
LYMPHOCYTES ABSOLUTE: 2.13 E9/L (ref 1.5–4)
LYMPHOCYTES RELATIVE PERCENT: 36.9 % (ref 20–42)
MCH RBC QN AUTO: 29.2 PG (ref 26–35)
MCHC RBC AUTO-ENTMCNC: 31.5 % (ref 32–34.5)
MCV RBC AUTO: 92.7 FL (ref 80–99.9)
MONOCYTES ABSOLUTE: 0.5 E9/L (ref 0.1–0.95)
MONOCYTES RELATIVE PERCENT: 8.7 % (ref 2–12)
NEUTROPHILS ABSOLUTE: 2.83 E9/L (ref 1.8–7.3)
NEUTROPHILS RELATIVE PERCENT: 48.9 % (ref 43–80)
PDW BLD-RTO: 12.7 FL (ref 11.5–15)
PLATELET # BLD: 331 E9/L (ref 130–450)
PMV BLD AUTO: 10.9 FL (ref 7–12)
POTASSIUM SERPL-SCNC: 4.4 MMOL/L (ref 3.5–5)
RBC # BLD: 4.8 E12/L (ref 3.5–5.5)
SODIUM BLD-SCNC: 143 MMOL/L (ref 132–146)
TOTAL PROTEIN: 7.7 G/DL (ref 6.4–8.3)
TRIGL SERPL-MCNC: 136 MG/DL (ref 0–149)
VLDLC SERPL CALC-MCNC: 27 MG/DL
WBC # BLD: 5.8 E9/L (ref 4.5–11.5)

## 2020-02-03 PROCEDURE — 80061 LIPID PANEL: CPT

## 2020-02-03 PROCEDURE — 85025 COMPLETE CBC W/AUTO DIFF WBC: CPT

## 2020-02-03 PROCEDURE — 36415 COLL VENOUS BLD VENIPUNCTURE: CPT

## 2020-02-03 PROCEDURE — 80053 COMPREHEN METABOLIC PANEL: CPT

## 2020-02-11 ENCOUNTER — OFFICE VISIT (OUTPATIENT)
Dept: FAMILY MEDICINE CLINIC | Age: 60
End: 2020-02-11
Payer: COMMERCIAL

## 2020-02-11 VITALS
BODY MASS INDEX: 33.36 KG/M2 | WEIGHT: 200.5 LBS | DIASTOLIC BLOOD PRESSURE: 80 MMHG | TEMPERATURE: 96.9 F | HEART RATE: 88 BPM | RESPIRATION RATE: 16 BRPM | OXYGEN SATURATION: 99 % | SYSTOLIC BLOOD PRESSURE: 128 MMHG

## 2020-02-11 PROBLEM — I82.431 DEEP VEIN THROMBOSIS (DVT) OF POPLITEAL VEIN OF RIGHT LOWER EXTREMITY (HCC): Status: RESOLVED | Noted: 2018-05-02 | Resolved: 2020-02-11

## 2020-02-11 PROBLEM — J30.9 ALLERGIC RHINITIS DUE TO ALLERGEN: Status: ACTIVE | Noted: 2020-02-11

## 2020-02-11 PROBLEM — I26.99 PULMONARY EMBOLISM, BILATERAL (HCC): Status: RESOLVED | Noted: 2018-05-01 | Resolved: 2020-02-11

## 2020-02-11 LAB
EXPIRATORY TIME-POST: NORMAL
EXPIRATORY TIME: NORMAL
FEF 25-75% %CHNG: NORMAL
FEF 25-75% %PRED-POST: NORMAL
FEF 25-75% %PRED-PRE: NORMAL
FEF 25-75% PRED: NORMAL
FEF 25-75%-POST: NORMAL
FEF 25-75%-PRE: NORMAL
FEV1 %PRED-POST: NORMAL
FEV1 %PRED-PRE: 110 %
FEV1 PRED: NORMAL
FEV1-POST: NORMAL
FEV1-PRE: NORMAL
FEV1/FVC %PRED-POST: NORMAL
FEV1/FVC %PRED-PRE: NORMAL
FEV1/FVC PRED: NORMAL
FEV1/FVC-POST: NORMAL
FEV1/FVC-PRE: 108 %
FVC %PRED-POST: NORMAL
FVC %PRED-PRE: NORMAL
FVC PRED: NORMAL
FVC-POST: NORMAL
FVC-PRE: NORMAL
PEF %PRED-POST: NORMAL
PEF %PRED-PRE: NORMAL
PEF PRED: NORMAL
PEF%CHNG: NORMAL
PEF-POST: NORMAL
PEF-PRE: NORMAL

## 2020-02-11 PROCEDURE — 99396 PREV VISIT EST AGE 40-64: CPT | Performed by: FAMILY MEDICINE

## 2020-02-11 ASSESSMENT — PATIENT HEALTH QUESTIONNAIRE - PHQ9
SUM OF ALL RESPONSES TO PHQ QUESTIONS 1-9: 0
SUM OF ALL RESPONSES TO PHQ9 QUESTIONS 1 & 2: 0
2. FEELING DOWN, DEPRESSED OR HOPELESS: 0
SUM OF ALL RESPONSES TO PHQ QUESTIONS 1-9: 0
1. LITTLE INTEREST OR PLEASURE IN DOING THINGS: 0

## 2020-02-11 ASSESSMENT — PULMONARY FUNCTION TESTS
FEV1/FVC_PRE: 108
FEV1_PERCENT_PREDICTED_PRE: 110

## 2020-02-11 NOTE — PROGRESS NOTES
Socioeconomic History    Marital status: Single     Spouse name: Not on file    Number of children: Not on file    Years of education: Not on file    Highest education level: Not on file   Occupational History    Not on file   Social Needs    Financial resource strain: Not on file    Food insecurity:     Worry: Not on file     Inability: Not on file    Transportation needs:     Medical: Not on file     Non-medical: Not on file   Tobacco Use    Smoking status: Never Smoker    Smokeless tobacco: Never Used   Substance and Sexual Activity    Alcohol use: No    Drug use: No    Sexual activity: Not on file   Lifestyle    Physical activity:     Days per week: Not on file     Minutes per session: Not on file    Stress: Not on file   Relationships    Social connections:     Talks on phone: Not on file     Gets together: Not on file     Attends Restorationism service: Not on file     Active member of club or organization: Not on file     Attends meetings of clubs or organizations: Not on file     Relationship status: Not on file    Intimate partner violence:     Fear of current or ex partner: Not on file     Emotionally abused: Not on file     Physically abused: Not on file     Forced sexual activity: Not on file   Other Topics Concern    Not on file   Social History Narrative    Not on file     Family History   Problem Relation Age of Onset    Cancer Mother         colon    Heart Disease Father     High Blood Pressure Father     Cancer Father         prostate    Heart Disease Maternal Aunt     Heart Disease Maternal Uncle     Heart Disease Paternal Aunt     Heart Disease Paternal Uncle                             Review Of Systems    Skin: no abnormal pigmentation, rash, scaling, itching, masses, hair or nail changes  Eyes: no blurring, diplopia, or eye pain  Ears/Nose/Throat: no hearing loss, tinnitus, vertigo, nosebleed, nasal congestion, rhinorrhea, sore throat  Respiratory: no cough, pleuritic

## 2020-02-11 NOTE — PATIENT INSTRUCTIONS
LOW SALT,LOW CARB. AND LOW FAT DIET. CONTINUE CURRENT MEDICATIONS TAKING REGULARLY. REGULAR WALKING ADVISED. ADVISED WEIGHT REDUCTION. SEE GASTROENTEROLOGIST AND ALLERGIST AS SCHEDULED. FASTING FOR LAB WORK ONE MORNING.   NEXT APPOINTMENT IN 1 MONTH

## 2020-02-18 ENCOUNTER — OFFICE VISIT (OUTPATIENT)
Dept: FAMILY MEDICINE CLINIC | Age: 60
End: 2020-02-18
Payer: COMMERCIAL

## 2020-02-18 VITALS
RESPIRATION RATE: 16 BRPM | BODY MASS INDEX: 32.99 KG/M2 | OXYGEN SATURATION: 97 % | DIASTOLIC BLOOD PRESSURE: 84 MMHG | SYSTOLIC BLOOD PRESSURE: 126 MMHG | WEIGHT: 198 LBS | HEIGHT: 65 IN | HEART RATE: 84 BPM | TEMPERATURE: 97.6 F

## 2020-02-18 PROCEDURE — 99214 OFFICE O/P EST MOD 30 MIN: CPT | Performed by: FAMILY MEDICINE

## 2020-02-18 RX ORDER — DICYCLOMINE HYDROCHLORIDE 10 MG/1
10 CAPSULE ORAL 4 TIMES DAILY
Qty: 40 CAPSULE | Refills: 0 | Status: SHIPPED
Start: 2020-02-18 | End: 2020-03-11 | Stop reason: ALTCHOICE

## 2020-02-18 NOTE — PROGRESS NOTES
OFFICE PROGRESS NOTE      SUBJECTIVE:        Patient ID:   Micheal Andrade is a 62 y.o. female who presents for   Chief Complaint   Patient presents with    Flank Pain     Left side pain, bowel movements are more diarrhea then normal,  but normal pattern and very gassy . Will be seeing Dr. Salgado Nurse next week. HPI:     Abdominal Pain: Patient presents for evaluation of abdominal pain. The pain is located in the LLQ without radiation. Pain is described as colicky and cramping, and measures 7/10 in intensity. Onset of pain was 2 months ago. Aggravating factors include none. Alleviating factors include bowel movements and flatus. Associated symptoms include diarrhea at times. Marisol Diamond SEEING DR. Audrie Spurling IN 1 WEEK FOR GI EVALUATION. ALSO SEEING ALLERGIST SOON. NEEDS SOME RELIEF WITH ABDOMINAL PAINS. WATCHING DIET GOOD. WALKING SOME FOR EXERCISE. TAKING MEDICATIONS REGULARLY. Prior to Admission medications    Medication Sig Start Date End Date Taking?  Authorizing Provider   rivaroxaban (XARELTO) 20 MG TABS tablet Take 20 mg by mouth Daily with supper   Yes Historical Provider, MD   folic acid (FOLVITE) 1 MG tablet Take 1 tablet by mouth daily 1/7/20 1/1/21 Yes FERNANDO Solis CNP     Social History     Socioeconomic History    Marital status: Single     Spouse name: Not on file    Number of children: Not on file    Years of education: Not on file    Highest education level: Not on file   Occupational History    Not on file   Social Needs    Financial resource strain: Not on file    Food insecurity:     Worry: Not on file     Inability: Not on file    Transportation needs:     Medical: Not on file     Non-medical: Not on file   Tobacco Use    Smoking status: Never Smoker    Smokeless tobacco: Never Used   Substance and Sexual Activity    Alcohol use: No    Drug use: No    Sexual activity: Not on file   Lifestyle    Physical activity:     Days per week: Not on file     Minutes per session: Not on file    Stress: Not on file   Relationships    Social connections:     Talks on phone: Not on file     Gets together: Not on file     Attends Caodaism service: Not on file     Active member of club or organization: Not on file     Attends meetings of clubs or organizations: Not on file     Relationship status: Not on file    Intimate partner violence:     Fear of current or ex partner: Not on file     Emotionally abused: Not on file     Physically abused: Not on file     Forced sexual activity: Not on file   Other Topics Concern    Not on file   Social History Narrative    Not on file       I have reviewed Leahs allergies, medications, problem list, medical, social and family history and have updated as needed in the electronic medical record  Review Of Systems:    Skin: no abnormal pigmentation, rash, scaling, itching, masses, hair or nail changes  Eyes: no blurring, diplopia, or eye pain  Ears/Nose/Throat: no hearing loss, tinnitus, vertigo, nosebleed, nasal congestion, rhinorrhea, sore throat  Respiratory: no cough, pleuritic chest pain, dyspnea, or wheezing  Cardiovascular: no chest pain, angina, dyspnea on exertion, orthopnea, PND, palpitations, or claudication  Gastrointestinal: no nausea, vomiting, heartburn, diarrhea, constipation, bloating,  abdominal pain, or blood per rectum. Appetite is good  Genitourinary: no urinary urgency, frequency, dysuria, nocturia, hesitancy, or incontinence  Musculoskeletal: Ambulating well  Neurologic: no paralysis, paresis, paresthesia, seizures, tremors, or headaches  Hematologic/Lymphatic/Immunologic: no anemia, abnormal bleeding/bruising, fever, chills, night sweats, swollen glands, or unexplained weight loss  Endocrine: no heat or cold intolerance and no polyphagia, polydipsia, or polyuria        OBJECTIVE:     VS:  Wt Readings from Last 3 Encounters:   02/18/20 198 lb (89.8 kg)   02/11/20 200 lb 8 oz (90.9 kg)

## 2020-03-11 RX ORDER — OMEPRAZOLE 20 MG/1
CAPSULE, DELAYED RELEASE ORAL
COMMUNITY
Start: 2020-02-24 | End: 2021-01-03 | Stop reason: ALTCHOICE

## 2020-03-11 NOTE — PROGRESS NOTES
[facility] [facility]                                                                                                                    PRE OP INSTRUCTIONS FOR  Autumn Wick        Date: 3/11/2020    Date of surgery: 3/12/20   Arrival Time: Hospital will call you between 5pm and 7pm with your final arrival time for surgery    1. Do not eat or drink anything after midnight prior to surgery. This includes no water, chewing gum, mints or ice chips. 2. Take the following medications with a small sip of water on the morning of Surgery:  none    3. Diabetics may take evening dose of insulin but none after midnight. If you feel symptomatic or low blood sugar morning of surgery drink 1-2 ounces of apple juice only. 4. Aspirin, Ibuprofen, Advil, Naproxen, Vitamin E and other Anti-inflammatory products should be stopped  before surgery  as directed by your physician. Take Tylenol only unless instructed otherwise by your surgeon. 5. Check with your Doctor regarding stopping Plavix, Coumadin, Lovenox, Eliquis, Effient, or other blood thinners. 6. Do not smoke,use illicit drugs and do not drink any alcoholic beverages 24 hours prior to surgery. 7. You may brush your teeth the morning of surgery. DO NOT SWALLOW WATER    8. You MUST make arrangements for a responsible adult to take you home after your surgery. You will not be allowed to leave alone or drive yourself home. It is strongly suggested someone stay with you the first 24 hrs. Your surgery will be cancelled if you do not have a ride home. 9. PEDIATRIC PATIENTS ONLY:  A parent/legal guardian must accompany a child scheduled for surgery and plan to stay at the hospital until the child is discharged. Please do not bring other children with you.     10. Please wear simple, loose fitting clothing to the hospital.  Yamila Sheriden not bring valuables (money, credit cards, checkbooks, etc.) Do not wear any makeup (including no eye makeup) or nail polish on your fingers or toes. 11. DO NOT wear any jewelry or piercings on day of surgery. All body piercing jewelry must be removed. 12. Shower the night before surgery with _x__Antibacterial soap /HARRY WIPES________    13. TOTAL JOINT REPLACEMENT/HYSTERECTOMY PATIENTS ONLY---Remember to bring Blood Bank bracelet to the hospital on the day of surgery. 14. If you have a Living Will and Durable Power of  for Healthcare, please bring in a copy. 15. If appropriate bring crutches, inspirex, WALKER, CANE etc... 12. Notify your Surgeon if you develop any illness between now and surgery time, cough, cold, fever, sore throat, nausea, vomiting, etc.  Please notify your surgeon if you experience dizziness, shortness of breath or blurred vision between now & the time of your surgery. 17. If you have ___dentures, they will be removed before going to the OR; we will provide you a container. If you wear ___contact lenses or _x__glasses, they will be removed; please bring a case for them. 18. To provide excellent care visitors will be limited to 2 in the room at any given time. 19. Please bring picture ID and insurance card. 20. Sleep apnea patients need to bring CPAP AND SETTINGS to hospital on day of surgery. 21. During flu season no children under the age of 15 are permitted in the hospital for the safety of all patients. 22. Other                  Please call AMBULATORY CARE if you have any further questions.    [facility]     [address]

## 2020-03-12 ENCOUNTER — ANESTHESIA (OUTPATIENT)
Dept: ENDOSCOPY | Age: 60
End: 2020-03-12
Payer: COMMERCIAL

## 2020-03-12 ENCOUNTER — HOSPITAL ENCOUNTER (OUTPATIENT)
Age: 60
Setting detail: OUTPATIENT SURGERY
Discharge: HOME | End: 2020-03-12
Attending: INTERNAL MEDICINE | Admitting: INTERNAL MEDICINE
Payer: COMMERCIAL

## 2020-03-12 ENCOUNTER — ANESTHESIA EVENT (OUTPATIENT)
Dept: ENDOSCOPY | Age: 60
End: 2020-03-12
Payer: COMMERCIAL

## 2020-03-12 VITALS
HEIGHT: 65 IN | TEMPERATURE: 98.2 F | OXYGEN SATURATION: 100 % | RESPIRATION RATE: 18 BRPM | HEART RATE: 69 BPM | BODY MASS INDEX: 31.49 KG/M2 | WEIGHT: 189 LBS | DIASTOLIC BLOOD PRESSURE: 70 MMHG | SYSTOLIC BLOOD PRESSURE: 145 MMHG

## 2020-03-12 VITALS
DIASTOLIC BLOOD PRESSURE: 100 MMHG | SYSTOLIC BLOOD PRESSURE: 154 MMHG | OXYGEN SATURATION: 100 % | RESPIRATION RATE: 22 BRPM

## 2020-03-12 PROCEDURE — 3700000001 HC ADD 15 MINUTES (ANESTHESIA): Performed by: INTERNAL MEDICINE

## 2020-03-12 PROCEDURE — 7100000010 HC PHASE II RECOVERY - FIRST 15 MIN: Performed by: INTERNAL MEDICINE

## 2020-03-12 PROCEDURE — 88305 TISSUE EXAM BY PATHOLOGIST: CPT

## 2020-03-12 PROCEDURE — 2580000003 HC RX 258: Performed by: ANESTHESIOLOGY

## 2020-03-12 PROCEDURE — 7100000011 HC PHASE II RECOVERY - ADDTL 15 MIN: Performed by: INTERNAL MEDICINE

## 2020-03-12 PROCEDURE — 6370000000 HC RX 637 (ALT 250 FOR IP): Performed by: ANESTHESIOLOGY

## 2020-03-12 PROCEDURE — 6360000002 HC RX W HCPCS

## 2020-03-12 PROCEDURE — 3609027000 HC COLONOSCOPY: Performed by: INTERNAL MEDICINE

## 2020-03-12 PROCEDURE — 2709999900 HC NON-CHARGEABLE SUPPLY: Performed by: INTERNAL MEDICINE

## 2020-03-12 PROCEDURE — 3700000000 HC ANESTHESIA ATTENDED CARE: Performed by: INTERNAL MEDICINE

## 2020-03-12 PROCEDURE — 3609012400 HC EGD TRANSORAL BIOPSY SINGLE/MULTIPLE: Performed by: INTERNAL MEDICINE

## 2020-03-12 RX ORDER — PROPOFOL 10 MG/ML
INJECTION, EMULSION INTRAVENOUS PRN
Status: DISCONTINUED | OUTPATIENT
Start: 2020-03-12 | End: 2020-03-12 | Stop reason: SDUPTHER

## 2020-03-12 RX ORDER — SCOLOPAMINE TRANSDERMAL SYSTEM 1 MG/1
1 PATCH, EXTENDED RELEASE TRANSDERMAL ONCE
Status: DISCONTINUED | OUTPATIENT
Start: 2020-03-12 | End: 2020-03-12 | Stop reason: HOSPADM

## 2020-03-12 RX ORDER — SODIUM CHLORIDE, SODIUM LACTATE, POTASSIUM CHLORIDE, CALCIUM CHLORIDE 600; 310; 30; 20 MG/100ML; MG/100ML; MG/100ML; MG/100ML
INJECTION, SOLUTION INTRAVENOUS CONTINUOUS
Status: DISCONTINUED | OUTPATIENT
Start: 2020-03-12 | End: 2020-03-12 | Stop reason: HOSPADM

## 2020-03-12 RX ORDER — SODIUM CHLORIDE 0.9 % (FLUSH) 0.9 %
10 SYRINGE (ML) INJECTION EVERY 12 HOURS SCHEDULED
Status: DISCONTINUED | OUTPATIENT
Start: 2020-03-12 | End: 2020-03-12 | Stop reason: HOSPADM

## 2020-03-12 RX ORDER — SODIUM CHLORIDE 0.9 % (FLUSH) 0.9 %
10 SYRINGE (ML) INJECTION PRN
Status: DISCONTINUED | OUTPATIENT
Start: 2020-03-12 | End: 2020-03-12 | Stop reason: HOSPADM

## 2020-03-12 RX ADMIN — PROPOFOL 100 MG: 10 INJECTION, EMULSION INTRAVENOUS at 10:19

## 2020-03-12 RX ADMIN — PROPOFOL 100 MG: 10 INJECTION, EMULSION INTRAVENOUS at 10:23

## 2020-03-12 RX ADMIN — SODIUM CHLORIDE, POTASSIUM CHLORIDE, SODIUM LACTATE AND CALCIUM CHLORIDE: 600; 310; 30; 20 INJECTION, SOLUTION INTRAVENOUS at 09:50

## 2020-03-12 RX ADMIN — PROPOFOL 100 MG: 10 INJECTION, EMULSION INTRAVENOUS at 10:11

## 2020-03-12 RX ADMIN — PROPOFOL 100 MG: 10 INJECTION, EMULSION INTRAVENOUS at 10:27

## 2020-03-12 ASSESSMENT — PAIN SCALES - GENERAL
PAINLEVEL_OUTOF10: 0
PAINLEVEL_OUTOF10: 0

## 2020-03-12 ASSESSMENT — PAIN - FUNCTIONAL ASSESSMENT: PAIN_FUNCTIONAL_ASSESSMENT: 0-10

## 2020-03-12 NOTE — ANESTHESIA PRE PROCEDURE
Department of Anesthesiology  Preprocedure Note       Name:  Shyla Collins   Age:  62 y.o.  :  1960                                          MRN:  [id number]         Date:  3/12/2020      Surgeon: Sha Cardoza):  Meryl Odell MD    Procedure: EGD ESOPHAGOGASTRODUODENOSCOPY & COLONOSCOPY (N/A )  COLONOSCOPY (N/A )    Medications prior to admission:   Prior to Admission medications    Medication Sig Start Date End Date Taking? Authorizing Provider   rivaroxaban (XARELTO) 20 MG TABS tablet Take 20 mg by mouth Daily with supper   Yes Historical Provider, MD   folic acid (FOLVITE) 1 MG tablet Take 1 tablet by mouth daily  Patient taking differently: Take 0.5 mg by mouth daily  20 Yes FERNANDO Soliman CNP   omeprazole (PRILOSEC) 20 MG delayed release capsule TAKE ONE CAPSULE BY MOUTH DAILY 20   Historical Provider, MD       Current medications:    Current Facility-Administered Medications   Medication Dose Route Frequency Provider Last Rate Last Dose    sodium chloride flush 0.9 % injection 10 mL  10 mL Intravenous 2 times per day Meryl Odell MD        sodium chloride flush 0.9 % injection 10 mL  10 mL Intravenous PRN Meryl Odell MD        lactated ringers infusion   Intravenous Continuous Tressa Swartz  mL/hr at 20 0950      scopolamine (TRANSDERM-SCOP) transdermal patch 1 patch  1 patch Transdermal Once Tressa Swartz MD   1 patch at 20 0950       Allergies: Allergies   Allergen Reactions    Amoxicillin Itching       Problem List:    Patient Active Problem List   Diagnosis Code    Hypercholesteremia E78.00    Lumbar strain S39.012A    Obesity (BMI 30.0-34. 9) E66.9    Allergic rhinitis due to allergen J30.9       Past Medical History:        Diagnosis Date    Deep vein thrombosis (DVT) of popliteal vein of right lower extremity ([address] 2018    Hx of blood clots     Hyperlipidemia     no medication    Nausea & vomiting     Pneumonia     PONV (postoperative nausea and vomiting)     Pulmonary embolism, bilateral ([address] 5/1/2018    SOB (shortness of breath)        Past Surgical History:        Procedure Laterality Date    COLONOSCOPY  5/16/13    EYE SURGERY      LAPAROSCOPY      endometreosis    TOE SURGERY      4th toe right foot    TONSILLECTOMY         Social History:    Social History     Tobacco Use    Smoking status: Never Smoker    Smokeless tobacco: Never Used   Substance Use Topics    Alcohol use: No                                Counseling given: Not Answered      Vital Signs (Current):   Vitals:    03/12/20 0918   BP: 137/77   Pulse: 88   Resp: 18   Temp: 36.9 °C (98.4 °F)   TempSrc: Temporal   SpO2: 100%   Weight: 189 lb (85.7 kg)   Height: 5' 5\" (1.651 m)                                              BP Readings from Last 3 Encounters:   03/12/20 137/77   02/18/20 126/84   02/11/20 128/80       NPO Status: Time of last liquid consumption: 2145                        Time of last solid consumption: 1800                        Date of last liquid consumption: 03/11/20                        Date of last solid food consumption: 03/10/20    BMI:   Wt Readings from Last 3 Encounters:   03/12/20 189 lb (85.7 kg)   02/18/20 198 lb (89.8 kg)   02/11/20 200 lb 8 oz (90.9 kg)     Body mass index is 31.45 kg/m².     CBC:   Lab Results   Component Value Date    WBC 5.8 02/03/2020    RBC 4.80 02/03/2020    HGB 14.0 02/03/2020    HCT 44.5 02/03/2020    MCV 92.7 02/03/2020    RDW 12.7 02/03/2020     02/03/2020       CMP:   Lab Results   Component Value Date     02/03/2020    K 4.4 02/03/2020    K 3.5 05/02/2018     02/03/2020    CO2 21 02/03/2020    BUN 10 02/03/2020    CREATININE 0.9 02/03/2020    GFRAA >60 02/03/2020    LABGLOM >60 02/03/2020    GLUCOSE 102 02/03/2020    GLUCOSE 84 11/12/2010    PROT 7.7 02/03/2020    CALCIUM 10.2 02/03/2020    BILITOT 0.5 02/03/2020    ALKPHOS 108 02/03/2020    AST 16 02/03/2020    ALT 12

## 2020-03-12 NOTE — H&P
H&p reviewed. No changes. Blood pressure 137/77, pulse 88, temperature 98.4 °F (36.9 °C), temperature source Temporal, resp. rate 18, height 5' 5\" (1.651 m), weight 189 lb (85.7 kg), last menstrual period 08/15/2010, SpO2 100 %, not currently breastfeeding.

## 2020-05-18 NOTE — ADDENDUM NOTE
Addendum  created 05/18/20 1248 by Nathalie Marion MD    Attestation recorded in [address] [place] [address], Clinical Note Signed, [address] filed

## 2020-09-13 ENCOUNTER — APPOINTMENT (OUTPATIENT)
Dept: GENERAL RADIOLOGY | Age: 60
End: 2020-09-13
Payer: COMMERCIAL

## 2020-09-13 ENCOUNTER — HOSPITAL ENCOUNTER (EMERGENCY)
Age: 60
Discharge: HOME | End: 2020-09-13
Payer: COMMERCIAL

## 2020-09-13 VITALS
OXYGEN SATURATION: 98 % | DIASTOLIC BLOOD PRESSURE: 68 MMHG | WEIGHT: 199 LBS | HEART RATE: 85 BPM | TEMPERATURE: 97.7 F | RESPIRATION RATE: 16 BRPM | BODY MASS INDEX: 33.12 KG/M2 | SYSTOLIC BLOOD PRESSURE: 109 MMHG

## 2020-09-13 PROCEDURE — 73060 X-RAY EXAM OF HUMERUS: CPT

## 2020-09-13 PROCEDURE — 99212 OFFICE O/P EST SF 10 MIN: CPT

## 2020-09-13 RX ORDER — NAPROXEN 500 MG/1
500 TABLET ORAL 2 TIMES DAILY
Qty: 14 TABLET | Refills: 0 | Status: SHIPPED | OUTPATIENT
Start: 2020-09-13 | End: 2021-06-02 | Stop reason: ALTCHOICE

## 2020-09-13 NOTE — ED PROVIDER NOTES
smokeless tobacco. She reports that she does not drink alcohol or use drugs. Family History: family history includes Cancer in her father and mother; Heart Disease in her father, maternal aunt, maternal uncle, paternal aunt, and paternal uncle; High Blood Pressure in her father. Allergies: Amoxicillin    Physical Exam   Oxygen Saturation Interpretation: Normal.   ED Triage Vitals [09/13/20 1056]   BP Temp Temp src Pulse Resp SpO2 Height Weight   109/68 97.7 °F (36.5 °C) -- 85 16 98 % -- 199 lb (90.3 kg)       Physical Exam  · Constitutional/General: Alert and oriented x3, well appearing, non toxic in NAD  · HEENT:  NC/NT. Conjunctiva clear,  Airway patent. · Neck: Supple, full ROM,  · Respiratory: Not in respiratory distress  · CV:  Regular rate. Regular rhythm. .  · Musculoskeletal: She can move her right arm inward but she cannot externally rotate without pain there is no edema there are no abrasions or ecchymosis noted on the arm she has a good radial pulse she has good range of motion of the shoulder and elbow wrist and fingers without difficulty  · Integument: skin warm and dry. No rashes. · Lymphatic: no lymphadenopathy noted  · Neurologic: GCS 15, no focal deficits, symmetric strength 5/5 in the upper and lower extremities bilaterally  · Psychiatric: Normal Affect    Lab / Imaging Results   (All laboratory and radiology results have been personally reviewed by myself)  Labs:  No results found for this visit on 09/13/20. Imaging: All Radiology results interpreted by Radiologist unless otherwise noted. XR HUMERUS RIGHT (MIN 2 VIEWS)   Final Result   No acute radiographic findings in the right humerus. ED Course / Medical Decision Making   Medications - No data to display       MDM:   She s has some pain in the mid part of the upper arm I did obtain an x-ray and it was negative. Most likely muscle strain.   I did order her Naprosyn for pain she can apply ice for 10 minutes at a time every 2-3

## 2020-10-14 ENCOUNTER — OFFICE VISIT (OUTPATIENT)
Dept: ONCOLOGY | Age: 60
End: 2020-10-14
Payer: COMMERCIAL

## 2020-10-14 ENCOUNTER — HOSPITAL ENCOUNTER (OUTPATIENT)
Dept: INFUSION THERAPY | Age: 60
Discharge: HOME | End: 2020-10-14
Payer: COMMERCIAL

## 2020-10-14 VITALS
HEART RATE: 102 BPM | WEIGHT: 197 LBS | TEMPERATURE: 97.6 F | BODY MASS INDEX: 32.78 KG/M2 | SYSTOLIC BLOOD PRESSURE: 156 MMHG | DIASTOLIC BLOOD PRESSURE: 97 MMHG | OXYGEN SATURATION: 98 %

## 2020-10-14 PROCEDURE — 99212 OFFICE O/P EST SF 10 MIN: CPT | Performed by: INTERNAL MEDICINE

## 2020-10-14 RX ORDER — PANTOPRAZOLE SODIUM 40 MG/1
TABLET, DELAYED RELEASE ORAL
COMMUNITY
Start: 2020-08-17 | End: 2021-01-03 | Stop reason: ALTCHOICE

## 2020-10-14 NOTE — PROGRESS NOTES
[address] [facility]. [facility]        Pt Name: Taryn Sinclair  YOB: 1960  Date of evaluation: 10/14/2020  Primary Care Physician: Kiana Khoury MD  Reason for evaluation:   Chief Complaint   Patient presents with    Other     Thrombophilia    Other     Lateral pulmonary emboli and DVTs right lower extremity    1 Year Follow Up        Subjective: Here for follow-up  She fell in September and had a sprain and abrasion of her right forearm however without fractures and she still awaiting a splint and she had stopped Xarelto since        EGD and Colonoscopy on 3/12/2020 by Dr. Priscilla Uriostegui   For Diverticulosis and GERD. PATHOLOGY:  Diagnosis:   A. Gastric antrum, endoscopic biopsy: No diagnostic abnormality. B. Esophagus, endoscopic biopsy: Segment of mixed squamous and columnar gastric Cardia-type mucosa with chronic inflammation. CERVIX  This test amplifies and detects DNA of 14 high-risk HPV types   associated with cervical cancer and its precursor lesions (HPV types   16,18, 31, 33, 35, 39, 45, 51, 52, 56, 58, 59, 66, and 68). Sensitivity may be affected by specimen collection methods, stage of   infection, and the presence of interfering substances. Results should be interpreted in conjunction with other available   laboratory and clinical data. A negative high-risk HPV result does not exclude the possibility of   future cytologic HSIL or underlying CIN2-3 or cancer. OBJECTIVE:  VITALS:  weight is 197 lb (89.4 kg). Her temporal temperature is 97.6 °F (36.4 °C). Her blood pressure is 156/97 (abnormal) and her pulse is 102. Her oxygen saturation is 98%. Physical Exam:  Performance Status: 0  Well developed, well nourished female  General: AAO to person, place, time, in no acute distress. Head and neck: PERRLA, EOMI. Sclera non icteric. Oropharynx: Clear. Neck: no JVD,  no adenopathy. Heart: Regular rate and regular rhythm, no murmur.   Lungs: Clear to auscultation. Abdomen: Soft, non-tender;no masses, no organomegaly. Extremities: No edema,no cyanosis, no clubbing. Neurologic:Cranial nerves grossly intact. No focal motor or sensory deficits. Skin:  No rash. Medications  Prior to Admission medications    Medication Sig Start Date End Date Taking?  Authorizing Provider   naproxen (NAPROSYN) 500 MG tablet Take 1 tablet by mouth 2 times daily for 7 days PRN/pain 9/13/20 9/20/20  FERNANDO Posada Asa, CNP   omeprazole (PRILOSEC) 20 MG delayed release capsule TAKE ONE CAPSULE BY MOUTH DAILY 2/24/20   Historical Provider, MD   folic acid (FOLVITE) 1 MG tablet Take 1 tablet by mouth daily  Patient taking differently: Take 0.5 mg by mouth daily  1/7/20 1/1/21  FERNANDO Jarvis CNP    Scheduled Meds:  Continuous Infusions:  PRN Meds:.        Recent Laboratory Data-     Lab Results   Component Value Date    WBC 5.8 02/03/2020    HGB 14.0 02/03/2020    HCT 44.5 02/03/2020    MCV 92.7 02/03/2020     02/03/2020    LYMPHOPCT 36.9 02/03/2020    RBC 4.80 02/03/2020    MCH 29.2 02/03/2020    MCHC 31.5 (L) 02/03/2020    RDW 12.7 02/03/2020    NEUTOPHILPCT 48.9 02/03/2020    MONOPCT 8.7 02/03/2020    BASOPCT 0.5 02/03/2020    NEUTROABS 2.83 02/03/2020    LYMPHSABS 2.13 02/03/2020    MONOSABS 0.50 02/03/2020    EOSABS 0.27 02/03/2020    BASOSABS 0.03 02/03/2020       Lab Results   Component Value Date     02/03/2020    K 4.4 02/03/2020     02/03/2020    CO2 21 (L) 02/03/2020    BUN 10 02/03/2020    CREATININE 0.9 02/03/2020    GLUCOSE 102 (H) 02/03/2020    CALCIUM 10.2 02/03/2020    PROT 7.7 02/03/2020    LABALBU 4.5 02/03/2020    BILITOT 0.5 02/03/2020    ALKPHOS 108 (H) 02/03/2020    AST 16 02/03/2020    ALT 12 02/03/2020    LABGLOM >60 02/03/2020    GFRAA >60 02/03/2020              Ref. Range 5/2/2018 19:14 5/2/2018 19:15 6/13/2018 12:30 1/7/2019 14:27   D-Dimer, Ayaka Holder Latest Units: ng/mL DDU   <200 213 Radiology-  SCREENING BILATERAL MAMMOGRAM:  1/21/2020. Finding 1:    No suspicious masses, areas of suspicious architectural distortion, suspicious calcifications, or additional suspicious findings are identified.  There are no significant changes from the prior study.         IMPRESSION:    No mammographic evidence of malignancy.         Screening mammogram in 1 year is recommended.         =======================================    BI-RADS Category 1:  Negative            ASSESSMENT/PLAN :  A 47-year-old woman previously healthy with recent diagnosis of extensive bilateral pulmonary emboli and DVT of the right lower extremity which appears to be unprovoked  Her only risk factor is her obesity  She was initially treated with therapeutic dose of Lovenox 90 mg subcu twice a day and her dyspnea has significantly improved , She was then transitioned to oral Xarelto      Her antiphospholipid antibody titers was negative for with negative lupus anticoagulant and DRVVT. 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     She has a strong family history of miscarriages TIAs and dementia.     The fact that she had extensive pulmonary emboli with initially hypoxia on presentation and abnormal thrombophilia she will be recommended lifelong anticoagulation with Xarelto  In case she requires surgical procedures in the future her Xarelto will be discontinued one to 2 days prior to the procedure and she will need Lovenox bridging. She will benefit from folic acid supplements 1 mg daily to maintain normal homocysteine level in relation to her MTHFR 677 mutation . D-dimer was repeated 6/13/18 and was normal..     In view of recurrent dyspnea and palpitations a 2-D echo was repeated and  showed normal LVEF, mild diastolic dysfunction, no pericardial effusion and no evidence of right heart strain.   Her troponin and proBNP were normal  D-dimer was also repeated 1/7/19 And it was normal  She has been recommended lifelong anticoagulation    S/P EGD and Colonoscopy by Dr. Prashanth Ewing 3/12/2020. DX. hiatus hernia, short-segment columnar epithelium in the lower esophagus, biopsies. Biopsy gastric antrum, check for H. pylori. She had discontinued Xarelto after her recent trauma to the right forearm in September 2020  At this time she was recommended to take baby aspirin along with folic acid supplements and vitamin B complex. Cristobal Trevino. Attila Galeano M.D., F.A.C.P.   Electronically signed 10/14/2020 at 7:56 AM

## 2021-01-03 ENCOUNTER — APPOINTMENT (OUTPATIENT)
Dept: ULTRASOUND IMAGING | Age: 61
End: 2021-01-03
Payer: COMMERCIAL

## 2021-01-03 ENCOUNTER — HOSPITAL ENCOUNTER (EMERGENCY)
Age: 61
Discharge: HOME | End: 2021-01-03
Payer: COMMERCIAL

## 2021-01-03 VITALS
TEMPERATURE: 97.5 F | SYSTOLIC BLOOD PRESSURE: 130 MMHG | HEIGHT: 65 IN | RESPIRATION RATE: 16 BRPM | OXYGEN SATURATION: 100 % | WEIGHT: 195 LBS | BODY MASS INDEX: 32.49 KG/M2 | DIASTOLIC BLOOD PRESSURE: 90 MMHG | HEART RATE: 92 BPM

## 2021-01-03 DIAGNOSIS — M25.562 ACUTE PAIN OF LEFT KNEE: Primary | ICD-10-CM

## 2021-01-03 PROCEDURE — 93971 EXTREMITY STUDY: CPT

## 2021-01-03 PROCEDURE — 99212 OFFICE O/P EST SF 10 MIN: CPT

## 2021-01-03 RX ORDER — RABEPRAZOLE SODIUM 20 MG/1
20 TABLET, DELAYED RELEASE ORAL DAILY
COMMUNITY
End: 2021-06-02 | Stop reason: ALTCHOICE

## 2021-01-03 RX ORDER — ASPIRIN 81 MG/1
81 TABLET ORAL DAILY
COMMUNITY
End: 2021-09-02

## 2021-01-03 ASSESSMENT — PAIN DESCRIPTION - DESCRIPTORS: DESCRIPTORS: ACHING

## 2021-01-03 ASSESSMENT — PAIN - FUNCTIONAL ASSESSMENT: PAIN_FUNCTIONAL_ASSESSMENT: ACTIVITIES ARE NOT PREVENTED

## 2021-01-03 ASSESSMENT — PAIN DESCRIPTION - ONSET: ONSET: ON-GOING

## 2021-01-03 ASSESSMENT — PAIN DESCRIPTION - PAIN TYPE: TYPE: ACUTE PAIN

## 2021-01-03 NOTE — ED PROVIDER NOTES
Department of Emergency Medicine  [address] [place] [address]  Provider Note  Admit Date/Time: 1/3/2021  4:37 PM  Room: SHIELA/SHIELA  MRN: [id number]  Chief Complaint: Knee Pain (Left/  Pt states \"It started Hurting & then one morning woke up with it swollen & Starting Friday is when It started Achy & I'm paranoid as I had a DVT in My Right one January of 2017\")       History of Present Illness   Source of history provided by:  Patient. History/Exam Limitations: None. Matthias Gandara is a 56 y.o. female with a history of right lower extremity DVT 2 years ago provoked by relative immobilization. Subsequently was found to have factor V Leiden. She reports a 1 week history of mild left knee discomfort which increased over the last 2 days with some edema. No distal paresthesias or weakness. No chest pain. She has some shortness of breath at baseline over the last 3 years however this is completely unchanged. Is currently on aspirin but no anticoagulants. No recent immobilization or travel. ROS    Pertinent positives and negatives are stated within HPI, all other systems reviewed and are negative. Past Surgical History:   Procedure Laterality Date    COLONOSCOPY  5/16/13    COLONOSCOPY N/A 3/12/2020    COLONOSCOPY performed by Naty Lewis MD at   [facility] [address]      endometreosis    TOE SURGERY      4th toe right foot    TONSILLECTOMY      UPPER GASTROINTESTINAL ENDOSCOPY N/A 3/12/2020    EGD BIOPSY performed by [facility]y Lewis MD at [facility] History:  reports that she has never smoked. She has never used smokeless tobacco. She reports that she does not drink alcohol or use drugs. Family History: family history includes Cancer in her father and mother; Heart Disease in her father, maternal aunt, maternal uncle, paternal aunt, and paternal uncle; High Blood Pressure in her father.   Allergies: Amoxicillin    Physical Exam   Oxygen Saturation Interpretation: Normal.   ED Triage Vitals [01/03/21 1639]   BP Temp Temp Source Pulse Resp SpO2 Height Weight   (!) 130/90 97.5 °F (36.4 °C) Temporal 92 16 100 % 5' 5\" (1.651 m) 195 lb (88.5 kg)       Physical Exam  General: Vitals noted, no distress. Afebrile. EENT: Posterior oropharynx unremarkable. Cardiac: Regular, rate, rhythm, no murmur. Pulmonary: Lungs clear bilaterally with good aeration. No adventitious breath sounds. Abdomen: Soft, nonsurgical. Nontender. No peritoneal signs. Normoactive bowel sounds. Extremities: There is some tenderness in the left popliteal fossa and calf. No obvious gross asymmetry. Negative Homans bilaterally, no cords. Neurovascularly intact throughout. Compartments are soft to palpation. Skin: No rash. Neuro: No gross neurologic deficits. Lab / Imaging Results   (All laboratory and radiology results have been personally reviewed by myself)  Labs:  No results found for this visit on 01/03/21. Imaging: All Radiology results interpreted by Radiologist unless otherwise noted. US DUP LOWER EXTREMITY LEFT AYAH   Final Result   No evidence of DVT in the left lower extremity. ED Course / Medical Decision Making   Medications - No data to display       Consult(s):   None    Procedure(s):   None    Differential Diagnosis: Is extensive but includes DVT, superficial phlebitis, cellulitis, myositis, arterial insufficiency or occlusion, phlegmasia, compartment syndrome, etc.    MDM:   This is a 56 y.o. female who presents with the above history. On exam, there is some mild tenderness about the popliteal region and calf but is neurovascularly intact distally with no gross asymmetry. No phlegmasia. Duplex was obtained which shows no obvious DVT. We discussed this via telephone. Did discuss the possibility of a small, nonvisualized thrombus.  Discussed that if the symptoms do not resolve in the next several days will require repeat ultrasound and if they worsen in the interim will require repeat ultrasound prior to that. Counseling: I discussed the differential, results and discharge plan with the patient and/or family/friend/caregiver if present. I emphasized the importance of follow-up with the physician I referred them to in the timeframe recommended. I explained reasons for the patient to return to the Emergency Department. Additional verbal discharge instructions were also given and discussed with the patient to supplement those generated by the EMR. We also discussed medications that were prescribed (if any) including common side effects and interactions. The patient was advised to abstain from driving, operating heavy machinery or making significant decisions while taking medications such as opiates and muscle relaxers that may impair this. All questions were addressed. They understand return precautions and discharge instructions. The patient and/or family/friend/caregiver expressed understanding. Assessment      1. Acute pain of left knee      Plan   Discharge to home and advised to contact MARTINA Escobar [address] [place] [phone]    In 2 days     Patient condition is good    New Medications     New Prescriptions    No medications on file     Electronically signed by SHER Mata   DD: 1/3/21  **This report was transcribed using voice recognition software. Every effort was made to ensure accuracy; however, inadvertent computerized transcription errors may be present.   END OF ED PROVIDER NOTE         Aristides Sandhu [address], [address]  01/03/21 201 [address] [place] [address] [address], [address]  01/03/21 9053

## 2021-03-26 DIAGNOSIS — I26.99 PULMONARY EMBOLISM, BILATERAL (HCC): ICD-10-CM

## 2021-03-26 DIAGNOSIS — I82.431 DEEP VEIN THROMBOSIS (DVT) OF POPLITEAL VEIN OF RIGHT LOWER EXTREMITY, UNSPECIFIED CHRONICITY (HCC): ICD-10-CM

## 2021-03-26 RX ORDER — FOLIC ACID 1 MG/1
1 TABLET ORAL DAILY
Qty: 90 TABLET | Refills: 3 | Status: SHIPPED
Start: 2021-03-26 | End: 2021-09-02

## 2021-05-18 ENCOUNTER — HOSPITAL ENCOUNTER (OUTPATIENT)
Dept: ULTRASOUND IMAGING | Age: 61
Discharge: HOME | End: 2021-05-18
Payer: COMMERCIAL

## 2021-05-18 DIAGNOSIS — R10.12 LUQ PAIN: ICD-10-CM

## 2021-05-18 PROCEDURE — 76705 ECHO EXAM OF ABDOMEN: CPT

## 2021-05-27 NOTE — H&P
[address]                              HISTORY AND PHYSICAL    PATIENT NAME: Lu Shane                      :        1960  MED REC NO:   [id number]                            ROOM:  ACCOUNT NO:   [de-identified]                           ADMIT DATE: 2021  PROVIDER:     Johnny Mccauley MD      CHIEF COMPLAINT:  Epigastric pain. HISTORY OF PRESENT ILLNESS:  The patient is a 24-year-old. Epigastric  pains and discomfort. ALLERGIES:  None. CURRENT MEDICATIONS:  Omeprazole, folic acid, and aspirin. HABITS:  Denied. PAST MEDICAL HISTORY:  GERD, remote pulmonary embolism. PAST SURGICAL HISTORY:  None. PHYSICAL EXAMINATION:  GENERAL:  Alert and oriented. VITAL SIGNS:  /80, pulse 72, respirations 16, and temperature  97.1. HEENT:  Oral cavity negative. NECK:  Negative. The peripheral lymph nodes are not palpable. LUNGS:  Clear to auscultation. HEART:  Heart sounds regular. BREASTS:  Deferred. ABDOMEN:  Soft. No palpable masses. RECTAL:  Deferred. GENITALIA:  Deferred. EXTREMITIES:  Negative. NEUROLOGIC:  Oriented to time and space. No gross deficit. IMPRESSION:  Abdominal pain. PLAN:  EGD.         Jonathan Maguire MD    D: 2021 9:24:58       T: 2021 10:29:59     [email or web address]  Job#: [id number]     Doc#: [id number]    CC:

## 2021-06-02 RX ORDER — OMEPRAZOLE 20 MG/1
20 CAPSULE, DELAYED RELEASE ORAL DAILY
COMMUNITY
End: 2021-09-02

## 2021-06-02 NOTE — PROGRESS NOTES
[facility]                                                                                                                    PRE OP INSTRUCTIONS FOR  Juan Luis Gomes        Date: 6/2/2021    Date of surgery:  6/3/2021    Arrival Time: Hospital will call you between 5pm and 7pm with your final arrival time for surgery    1. Do not eat or drink anything after    Midnight  prior to surgery. This includes no water, chewing gum, mints or ice chips. 2. Take the following medications with a small sip of water on the morning of Surgery:     3. Diabetics may take evening dose of insulin but none after midnight. If you feel symptomatic or low blood sugar morning of surgery drink 1-2 ounces of apple juice only. 4. Aspirin, Ibuprofen, Advil, Naproxen, Vitamin E and other Anti-inflammatory products should be stopped  before surgery  as directed by your physician. Take Tylenol only unless instructed otherwise by your surgeon. 5. Check with your Doctor regarding stopping Plavix, Coumadin, Lovenox, Eliquis, Effient, or other blood thinners. 6. Do not smoke,use illicit drugs and do not drink any alcoholic beverages 24 hours prior to surgery. 7. You may brush your teeth the morning of surgery. DO NOT SWALLOW WATER    8. You MUST make arrangements for a responsible adult to take you home after your surgery. You will not be allowed to leave alone or drive yourself home. It is strongly suggested someone stay with you the first 24 hrs. Your surgery will be cancelled if you do not have a ride home. 9. PEDIATRIC PATIENTS ONLY:  A parent/legal guardian must accompany a child scheduled for surgery and plan to stay at the hospital until the child is discharged. Please do not bring other children with you.     10. Please wear simple, loose fitting clothing to the hospital.  Mariola Edwards not bring valuables (money, credit cards, checkbooks, etc.) Do not wear any makeup (including no eye makeup) or nail polish on your fingers or toes. 11. DO NOT wear any jewelry or piercings on day of surgery. All body piercing jewelry must be removed. 12. Shower the night before surgery with _xAntibacterial soap /HARRY WIPES________    13. TOTAL JOINT REPLACEMENT/HYSTERECTOMY PATIENTS ONLY---Remember to bring Blood Bank bracelet to the hospital on the day of surgery. 14. If you have a Living Will and Durable Power of  for Healthcare, please bring in a copy. 15. If appropriate bring crutches, inspirex, WALKER, CANE etc... 12. Notify your Surgeon if you develop any illness between now and surgery time, cough, cold, fever, sore throat, nausea, vomiting, etc.  Please notify your surgeon if you experience dizziness, shortness of breath or blurred vision between now & the time of your surgery. 17. If you have ___dentures, they will be removed before going to the OR; we will provide you a container. If you wear ___contact lenses or ___glasses, they will be removed; please bring a case for them. 18. To provide excellent care visitors will be limited to 2 in the room at any given time. 19. Please bring picture ID and insurance card. 20. Sleep apnea patients need to bring CPAP AND SETTINGS to hospital on day of surgery. 21. During flu season no children under the age of 15 are permitted in the hospital for the safety of all patients. 22. Other                Please call AMBULATORY CARE if you have any further questions.    [address] [facility]     [address]

## 2021-06-03 ENCOUNTER — TELEPHONE (OUTPATIENT)
Dept: FAMILY MEDICINE CLINIC | Age: 61
End: 2021-06-03

## 2021-06-03 ENCOUNTER — ANESTHESIA EVENT (OUTPATIENT)
Dept: ENDOSCOPY | Age: 61
End: 2021-06-03
Payer: COMMERCIAL

## 2021-06-03 ENCOUNTER — ANESTHESIA (OUTPATIENT)
Dept: ENDOSCOPY | Age: 61
End: 2021-06-03
Payer: COMMERCIAL

## 2021-06-03 ENCOUNTER — HOSPITAL ENCOUNTER (OUTPATIENT)
Age: 61
Setting detail: OUTPATIENT SURGERY
Discharge: HOME | End: 2021-06-03
Attending: INTERNAL MEDICINE | Admitting: INTERNAL MEDICINE
Payer: COMMERCIAL

## 2021-06-03 VITALS — OXYGEN SATURATION: 94 % | SYSTOLIC BLOOD PRESSURE: 149 MMHG | DIASTOLIC BLOOD PRESSURE: 100 MMHG

## 2021-06-03 VITALS
OXYGEN SATURATION: 100 % | TEMPERATURE: 98.5 F | HEIGHT: 65 IN | HEART RATE: 70 BPM | DIASTOLIC BLOOD PRESSURE: 94 MMHG | WEIGHT: 193 LBS | BODY MASS INDEX: 32.15 KG/M2 | SYSTOLIC BLOOD PRESSURE: 150 MMHG | RESPIRATION RATE: 16 BRPM

## 2021-06-03 PROCEDURE — 2580000003 HC RX 258: Performed by: ANESTHESIOLOGY

## 2021-06-03 PROCEDURE — 2709999900 HC NON-CHARGEABLE SUPPLY: Performed by: INTERNAL MEDICINE

## 2021-06-03 PROCEDURE — 7100000010 HC PHASE II RECOVERY - FIRST 15 MIN: Performed by: INTERNAL MEDICINE

## 2021-06-03 PROCEDURE — 7100000011 HC PHASE II RECOVERY - ADDTL 15 MIN: Performed by: INTERNAL MEDICINE

## 2021-06-03 PROCEDURE — 3609017100 HC EGD: Performed by: INTERNAL MEDICINE

## 2021-06-03 PROCEDURE — 3700000001 HC ADD 15 MINUTES (ANESTHESIA): Performed by: INTERNAL MEDICINE

## 2021-06-03 PROCEDURE — 3700000000 HC ANESTHESIA ATTENDED CARE: Performed by: INTERNAL MEDICINE

## 2021-06-03 PROCEDURE — 6360000002 HC RX W HCPCS: Performed by: NURSE ANESTHETIST, CERTIFIED REGISTERED

## 2021-06-03 PROCEDURE — 6370000000 HC RX 637 (ALT 250 FOR IP): Performed by: ANESTHESIOLOGY

## 2021-06-03 RX ORDER — SCOLOPAMINE TRANSDERMAL SYSTEM 1 MG/1
1 PATCH, EXTENDED RELEASE TRANSDERMAL
Status: DISCONTINUED | OUTPATIENT
Start: 2021-06-03 | End: 2021-06-03 | Stop reason: HOSPADM

## 2021-06-03 RX ORDER — SODIUM CHLORIDE 0.9 % (FLUSH) 0.9 %
5-40 SYRINGE (ML) INJECTION PRN
Status: DISCONTINUED | OUTPATIENT
Start: 2021-06-03 | End: 2021-06-03 | Stop reason: HOSPADM

## 2021-06-03 RX ORDER — PROPOFOL 10 MG/ML
INJECTION, EMULSION INTRAVENOUS PRN
Status: DISCONTINUED | OUTPATIENT
Start: 2021-06-03 | End: 2021-06-03 | Stop reason: SDUPTHER

## 2021-06-03 RX ORDER — SODIUM CHLORIDE, SODIUM LACTATE, POTASSIUM CHLORIDE, CALCIUM CHLORIDE 600; 310; 30; 20 MG/100ML; MG/100ML; MG/100ML; MG/100ML
INJECTION, SOLUTION INTRAVENOUS CONTINUOUS
Status: DISCONTINUED | OUTPATIENT
Start: 2021-06-03 | End: 2021-06-03 | Stop reason: HOSPADM

## 2021-06-03 RX ADMIN — PROPOFOL 50 MG: 10 INJECTION, EMULSION INTRAVENOUS at 07:20

## 2021-06-03 RX ADMIN — PROPOFOL 50 MG: 10 INJECTION, EMULSION INTRAVENOUS at 07:21

## 2021-06-03 RX ADMIN — SODIUM CHLORIDE, POTASSIUM CHLORIDE, SODIUM LACTATE AND CALCIUM CHLORIDE: 600; 310; 30; 20 INJECTION, SOLUTION INTRAVENOUS at 06:39

## 2021-06-03 ASSESSMENT — PAIN SCALES - GENERAL: PAINLEVEL_OUTOF10: 0

## 2021-06-03 ASSESSMENT — ENCOUNTER SYMPTOMS: SHORTNESS OF BREATH: 1

## 2021-06-03 ASSESSMENT — PAIN - FUNCTIONAL ASSESSMENT: PAIN_FUNCTIONAL_ASSESSMENT: 0-10

## 2021-06-03 NOTE — ANESTHESIA PRE PROCEDURE
Department of Anesthesiology  Preprocedure Note       Name:  Josseline Milian   Age:  56 y.o.  :  1960                                          MRN:  [id number]         Date:  6/3/2021      Surgeon: Meek Taylor):  Myla Jose MD    Procedure: Procedure(s):  EGD ESOPHAGOGASTRODUODENOSCOPY (PT GOT A PATCH IN THE PAST TO HELP WITH ANESTHESIA)    Medications prior to admission:   Prior to Admission medications    Medication Sig Start Date End Date Taking? Authorizing Provider   omeprazole (PRILOSEC) 20 MG delayed release capsule Take 20 mg by mouth daily   Yes Historical Provider, MD   folic acid (FOLVITE) 1 MG tablet Take 1 tablet by mouth daily 3/26/21 3/21/22 Yes Hiro Urbina APRN - CNP   aspirin EC 81 MG EC tablet Take 81 mg by mouth daily Last dose    Yes Historical Provider, MD       Current medications:    Current Facility-Administered Medications   Medication Dose Route Frequency Provider Last Rate Last Admin    lactated ringers infusion   Intravenous Continuous Mathew Jaramillo  mL/hr at 21 0639 New Bag at 21 0639    sodium chloride flush 0.9 % injection 5-40 mL  5-40 mL Intravenous PRN Mathew Jaramillo MD        scopolamine (TRANSDERM-SCOP) transdermal patch 1 patch  1 patch Transdermal Bijan Beckham MD   1 patch at 21 0631       Allergies: Allergies   Allergen Reactions    Amoxicillin Itching       Problem List:    Patient Active Problem List   Diagnosis Code    Hypercholesteremia E78.00    Lumbar strain S39.012A    Obesity (BMI 30.0-34. 9) E66.9    Allergic rhinitis due to allergen J30.9       Past Medical History:        Diagnosis Date    Deep vein thrombosis (DVT) of popliteal vein of right lower extremity ([address] 2018    Hx of blood clots     Hyperlipidemia     no medication    Nausea & vomiting     Pneumonia     PONV (postoperative nausea and vomiting)     Pulmonary embolism, bilateral ([address] 2018    SOB (shortness of breath) Past Surgical History:        Procedure Laterality Date    COLONOSCOPY  5/16/13    COLONOSCOPY N/A 3/12/2020    COLONOSCOPY performed by Enoc Guy MD at   [address]      endometreosis    TOE SURGERY      4th toe right foot    TONSILLECTOMY      UPPER GASTROINTESTINAL ENDOSCOPY N/A 3/12/2020    EGD BIOPSY performed by Enoc Guy MD at [address] History:    Social History     Tobacco Use    Smoking status: Never Smoker    Smokeless tobacco: Never Used   Substance Use Topics    Alcohol use: No                                Counseling given: Not Answered      Vital Signs (Current):   Vitals:    06/02/21 1356 06/03/21 0604   BP:  (!) 141/74   Pulse:  67   Resp:  16   Temp:  97.7 °F (36.5 °C)   TempSrc:  Infrared   SpO2:  95%   Weight: 189 lb (85.7 kg) 193 lb (87.5 kg)   Height: 5' 5\" (1.651 m) 5' 5\" (1.651 m)                                              BP Readings from Last 3 Encounters:   06/03/21 (!) 141/74   01/03/21 (!) 130/90   10/14/20 (!) 156/97       NPO Status: Time of last liquid consumption: 0001                        Time of last solid consumption: 2000                        Date of last liquid consumption: 06/03/21                        Date of last solid food consumption: 06/02/21    BMI:   Wt Readings from Last 3 Encounters:   06/03/21 193 lb (87.5 kg)   01/03/21 195 lb (88.5 kg)   10/14/20 197 lb (89.4 kg)     Body mass index is 32.12 kg/m².     CBC:   Lab Results   Component Value Date    WBC 5.8 02/03/2020    RBC 4.80 02/03/2020    HGB 14.0 02/03/2020    HCT 44.5 02/03/2020    MCV 92.7 02/03/2020    RDW 12.7 02/03/2020     02/03/2020       CMP:   Lab Results   Component Value Date     02/03/2020    K 4.4 02/03/2020    K 3.5 05/02/2018     02/03/2020    CO2 21 02/03/2020    BUN 10 02/03/2020    CREATININE 0.9 02/03/2020    GFRAA >60 02/03/2020    LABGLOM >60 02/03/2020    GLUCOSE 102 02/03/2020

## 2021-06-03 NOTE — H&P
H&p reviewed. No changes. Blood pressure (!) 141/74, pulse 67, temperature 97.7 °F (36.5 °C), temperature source Infrared, resp. rate 16, height 5' 5\" (1.651 m), weight 193 lb (87.5 kg), last menstrual period 08/15/2010, SpO2 95 %, not currently breastfeeding. The patient was counseled at length about the risks of jorge a Covid-19 during their perioperative period and any recovery window from their procedure. The patient was made aware that jorge a Covid-19  may worsen their prognosis for recovering from their procedure  and lend to a higher morbidity and/or mortality risk. All material risks, benefits, and reasonable alternatives including postponing the procedure were discussed. The patient does wish to proceed with the procedure at this time.

## 2021-06-03 NOTE — ANESTHESIA POSTPROCEDURE EVALUATION
Department of Anesthesiology  Postprocedure Note    Patient: Lindsay Watt  MRN: [id number]  YOB: 1960  Date of evaluation: 6/3/2021  Time:  8:44 AM     Procedure Summary     Date: 06/03/21 Room / Location: [address] [place] [address] / [facility]    Anesthesia Start: 1817 Anesthesia Stop: 2669    Procedure: EGD ESOPHAGOGASTRODUODENOSCOPY (PT GOT A PATCH IN THE PAST TO HELP WITH ANESTHESIA) (N/A ) Diagnosis: (EPIGASTRIC PAINS)    Surgeons: Jessica Jewell MD Responsible Provider: Dariana Modi MD    Anesthesia Type: MAC ASA Status: 3          Anesthesia Type: MAC    Aj Phase I: Aj Score: 10    Aj Phase II: Aj Score: 10    Last vitals: Reviewed and per EMR flowsheets.        Anesthesia Post Evaluation    Patient location during evaluation: PACU  Patient participation: complete - patient participated  Level of consciousness: awake  Pain score: 0  Airway patency: patent  Nausea & Vomiting: no nausea  Complications: no  Cardiovascular status: blood pressure returned to baseline  Respiratory status: acceptable  Hydration status: euvolemic

## 2021-06-07 DIAGNOSIS — E78.00 HYPERCHOLESTEREMIA: Primary | ICD-10-CM

## 2021-06-10 ENCOUNTER — APPOINTMENT (OUTPATIENT)
Dept: GENERAL RADIOLOGY | Age: 61
End: 2021-06-10
Payer: COMMERCIAL

## 2021-06-10 ENCOUNTER — OFFICE VISIT (OUTPATIENT)
Dept: FAMILY MEDICINE CLINIC | Age: 61
End: 2021-06-10
Payer: COMMERCIAL

## 2021-06-10 ENCOUNTER — NURSE TRIAGE (OUTPATIENT)
Dept: OTHER | Facility: CLINIC | Age: 61
End: 2021-06-10

## 2021-06-10 ENCOUNTER — HOSPITAL ENCOUNTER (OUTPATIENT)
Age: 61
Setting detail: OBSERVATION
Discharge: HOME | End: 2021-06-13
Attending: EMERGENCY MEDICINE | Admitting: FAMILY MEDICINE
Payer: COMMERCIAL

## 2021-06-10 ENCOUNTER — APPOINTMENT (OUTPATIENT)
Dept: CT IMAGING | Age: 61
End: 2021-06-10
Payer: COMMERCIAL

## 2021-06-10 VITALS
SYSTOLIC BLOOD PRESSURE: 122 MMHG | WEIGHT: 187 LBS | BODY MASS INDEX: 31.16 KG/M2 | TEMPERATURE: 97.3 F | DIASTOLIC BLOOD PRESSURE: 86 MMHG | OXYGEN SATURATION: 96 % | HEART RATE: 78 BPM | HEIGHT: 65 IN | RESPIRATION RATE: 16 BRPM

## 2021-06-10 DIAGNOSIS — Z87.39 H/O: GOUT: ICD-10-CM

## 2021-06-10 DIAGNOSIS — I26.94 MULTIPLE SUBSEGMENTAL PULMONARY EMBOLI WITHOUT ACUTE COR PULMONALE (HCC): Primary | ICD-10-CM

## 2021-06-10 DIAGNOSIS — J44.9 MILD CHRONIC OBSTRUCTIVE PULMONARY DISEASE (HCC): ICD-10-CM

## 2021-06-10 DIAGNOSIS — E66.9 OBESITY (BMI 30.0-34.9): ICD-10-CM

## 2021-06-10 DIAGNOSIS — E55.9 HYPOVITAMINOSIS D: ICD-10-CM

## 2021-06-10 DIAGNOSIS — E78.00 PRIMARY HYPERCHOLESTEROLEMIA: Primary | ICD-10-CM

## 2021-06-10 DIAGNOSIS — R06.02 SHORTNESS OF BREATH ON EXERTION: ICD-10-CM

## 2021-06-10 PROBLEM — I26.99 PULMONARY EMBOLISM (HCC): Status: ACTIVE | Noted: 2021-06-10

## 2021-06-10 LAB
ALBUMIN SERPL-MCNC: 4.4 G/DL (ref 3.5–5.2)
ALP BLD-CCNC: 123 U/L (ref 35–104)
ALT SERPL-CCNC: 13 U/L (ref 0–32)
ANION GAP SERPL CALCULATED.3IONS-SCNC: 14 MMOL/L (ref 7–16)
APTT: 27.9 SEC (ref 24.5–35.1)
AST SERPL-CCNC: 15 U/L (ref 0–31)
BASOPHILS ABSOLUTE: 0.03 E9/L (ref 0–0.2)
BASOPHILS RELATIVE PERCENT: 0.4 % (ref 0–2)
BILIRUB SERPL-MCNC: 0.4 MG/DL (ref 0–1.2)
BUN BLDV-MCNC: 10 MG/DL (ref 6–23)
CALCIUM SERPL-MCNC: 10.2 MG/DL (ref 8.6–10.2)
CHLORIDE BLD-SCNC: 108 MMOL/L (ref 98–107)
CO2: 21 MMOL/L (ref 22–29)
CREAT SERPL-MCNC: 0.9 MG/DL (ref 0.5–1)
D DIMER: 617 NG/ML DDU
EKG ATRIAL RATE: 92 BPM
EKG P AXIS: 69 DEGREES
EKG P-R INTERVAL: 160 MS
EKG Q-T INTERVAL: 368 MS
EKG QRS DURATION: 80 MS
EKG QTC CALCULATION (BAZETT): 455 MS
EKG R AXIS: 25 DEGREES
EKG T AXIS: 47 DEGREES
EKG VENTRICULAR RATE: 92 BPM
EOSINOPHILS ABSOLUTE: 0.15 E9/L (ref 0.05–0.5)
EOSINOPHILS RELATIVE PERCENT: 2.2 % (ref 0–6)
EXPIRATORY TIME-POST: NORMAL
EXPIRATORY TIME: NORMAL
FEF 25-75% %CHNG: NORMAL
FEF 25-75% %PRED-POST: NORMAL
FEF 25-75% %PRED-PRE: NORMAL
FEF 25-75% PRED: NORMAL
FEF 25-75%-POST: NORMAL
FEF 25-75%-PRE: NORMAL
FEV1 %PRED-POST: NORMAL
FEV1 %PRED-PRE: 78 %
FEV1 PRED: NORMAL
FEV1-POST: NORMAL
FEV1-PRE: NORMAL
FEV1/FVC %PRED-POST: NORMAL
FEV1/FVC %PRED-PRE: NORMAL
FEV1/FVC PRED: NORMAL
FEV1/FVC-POST: NORMAL
FEV1/FVC-PRE: 73 %
FVC %PRED-POST: NORMAL
FVC %PRED-PRE: NORMAL
FVC PRED: NORMAL
FVC-POST: NORMAL
FVC-PRE: NORMAL
GFR AFRICAN AMERICAN: >60
GFR NON-AFRICAN AMERICAN: >60 ML/MIN/1.73
GLUCOSE BLD-MCNC: 103 MG/DL (ref 74–99)
HCT VFR BLD CALC: 42 % (ref 34–48)
HEMOGLOBIN: 13.7 G/DL (ref 11.5–15.5)
IMMATURE GRANULOCYTES #: 0.01 E9/L
IMMATURE GRANULOCYTES %: 0.1 % (ref 0–5)
INR BLD: 1.1
LYMPHOCYTES ABSOLUTE: 1.86 E9/L (ref 1.5–4)
LYMPHOCYTES RELATIVE PERCENT: 26.8 % (ref 20–42)
MCH RBC QN AUTO: 29.9 PG (ref 26–35)
MCHC RBC AUTO-ENTMCNC: 32.6 % (ref 32–34.5)
MCV RBC AUTO: 91.7 FL (ref 80–99.9)
MONOCYTES ABSOLUTE: 0.55 E9/L (ref 0.1–0.95)
MONOCYTES RELATIVE PERCENT: 7.9 % (ref 2–12)
NEUTROPHILS ABSOLUTE: 4.35 E9/L (ref 1.8–7.3)
NEUTROPHILS RELATIVE PERCENT: 62.6 % (ref 43–80)
PDW BLD-RTO: 13.2 FL (ref 11.5–15)
PEF %PRED-POST: NORMAL
PEF %PRED-PRE: NORMAL
PEF PRED: NORMAL
PEF%CHNG: NORMAL
PEF-POST: NORMAL
PEF-PRE: NORMAL
PLATELET # BLD: 287 E9/L (ref 130–450)
PMV BLD AUTO: 10.6 FL (ref 7–12)
POTASSIUM REFLEX MAGNESIUM: 4 MMOL/L (ref 3.5–5)
PRO-BNP: 243 PG/ML (ref 0–125)
PROTHROMBIN TIME: 12.7 SEC (ref 9.3–12.4)
RBC # BLD: 4.58 E12/L (ref 3.5–5.5)
SARS-COV-2, NAAT: NOT DETECTED
SODIUM BLD-SCNC: 143 MMOL/L (ref 132–146)
TOTAL PROTEIN: 7.4 G/DL (ref 6.4–8.3)
TROPONIN, HIGH SENSITIVITY: <6 NG/L (ref 0–9)
WBC # BLD: 7 E9/L (ref 4.5–11.5)

## 2021-06-10 PROCEDURE — 85730 THROMBOPLASTIN TIME PARTIAL: CPT

## 2021-06-10 PROCEDURE — 85025 COMPLETE CBC W/AUTO DIFF WBC: CPT

## 2021-06-10 PROCEDURE — 85610 PROTHROMBIN TIME: CPT

## 2021-06-10 PROCEDURE — 87635 SARS-COV-2 COVID-19 AMP PRB: CPT

## 2021-06-10 PROCEDURE — 84484 ASSAY OF TROPONIN QUANT: CPT

## 2021-06-10 PROCEDURE — 99283 EMERGENCY DEPT VISIT LOW MDM: CPT

## 2021-06-10 PROCEDURE — 80053 COMPREHEN METABOLIC PANEL: CPT

## 2021-06-10 PROCEDURE — 96372 THER/PROPH/DIAG INJ SC/IM: CPT

## 2021-06-10 PROCEDURE — G0378 HOSPITAL OBSERVATION PER HR: HCPCS

## 2021-06-10 PROCEDURE — 83880 ASSAY OF NATRIURETIC PEPTIDE: CPT

## 2021-06-10 PROCEDURE — 93005 ELECTROCARDIOGRAM TRACING: CPT | Performed by: PHYSICIAN ASSISTANT

## 2021-06-10 PROCEDURE — 6360000004 HC RX CONTRAST MEDICATION: Performed by: RADIOLOGY

## 2021-06-10 PROCEDURE — 71275 CT ANGIOGRAPHY CHEST: CPT

## 2021-06-10 PROCEDURE — 6360000002 HC RX W HCPCS: Performed by: EMERGENCY MEDICINE

## 2021-06-10 PROCEDURE — 71046 X-RAY EXAM CHEST 2 VIEWS: CPT

## 2021-06-10 PROCEDURE — 2060000000 HC ICU INTERMEDIATE R&B

## 2021-06-10 PROCEDURE — 85378 FIBRIN DEGRADE SEMIQUANT: CPT

## 2021-06-10 PROCEDURE — 99214 OFFICE O/P EST MOD 30 MIN: CPT | Performed by: FAMILY MEDICINE

## 2021-06-10 PROCEDURE — 93010 ELECTROCARDIOGRAM REPORT: CPT | Performed by: INTERNAL MEDICINE

## 2021-06-10 RX ORDER — ALBUTEROL SULFATE 90 UG/1
1 AEROSOL, METERED RESPIRATORY (INHALATION) EVERY 4 HOURS PRN
Qty: 1 INHALER | Refills: 3 | Status: SHIPPED | OUTPATIENT
Start: 2021-06-10

## 2021-06-10 RX ADMIN — IOPAMIDOL 75 ML: 755 INJECTION, SOLUTION INTRAVENOUS at 19:40

## 2021-06-10 RX ADMIN — ENOXAPARIN SODIUM 80 MG: 80 INJECTION SUBCUTANEOUS at 21:48

## 2021-06-10 SDOH — ECONOMIC STABILITY: FOOD INSECURITY: WITHIN THE PAST 12 MONTHS, YOU WORRIED THAT YOUR FOOD WOULD RUN OUT BEFORE YOU GOT MONEY TO BUY MORE.: NEVER TRUE

## 2021-06-10 SDOH — ECONOMIC STABILITY: TRANSPORTATION INSECURITY
IN THE PAST 12 MONTHS, HAS LACK OF TRANSPORTATION KEPT YOU FROM MEETINGS, WORK, OR FROM GETTING THINGS NEEDED FOR DAILY LIVING?: NO

## 2021-06-10 SDOH — ECONOMIC STABILITY: HOUSING INSECURITY
IN THE LAST 12 MONTHS, WAS THERE A TIME WHEN YOU DID NOT HAVE A STEADY PLACE TO SLEEP OR SLEPT IN A SHELTER (INCLUDING NOW)?: NO

## 2021-06-10 SDOH — ECONOMIC STABILITY: HOUSING INSECURITY: IN THE LAST 12 MONTHS, HOW MANY PLACES HAVE YOU LIVED?: 1

## 2021-06-10 SDOH — ECONOMIC STABILITY: TRANSPORTATION INSECURITY
IN THE PAST 12 MONTHS, HAS THE LACK OF TRANSPORTATION KEPT YOU FROM MEDICAL APPOINTMENTS OR FROM GETTING MEDICATIONS?: NO

## 2021-06-10 SDOH — ECONOMIC STABILITY: INCOME INSECURITY: IN THE LAST 12 MONTHS, WAS THERE A TIME WHEN YOU WERE NOT ABLE TO PAY THE MORTGAGE OR RENT ON TIME?: NO

## 2021-06-10 SDOH — ECONOMIC STABILITY: FOOD INSECURITY: WITHIN THE PAST 12 MONTHS, THE FOOD YOU BOUGHT JUST DIDN'T LAST AND YOU DIDN'T HAVE MONEY TO GET MORE.: NEVER TRUE

## 2021-06-10 ASSESSMENT — PATIENT HEALTH QUESTIONNAIRE - PHQ9
2. FEELING DOWN, DEPRESSED OR HOPELESS: 0
SUM OF ALL RESPONSES TO PHQ QUESTIONS 1-9: 0
1. LITTLE INTEREST OR PLEASURE IN DOING THINGS: 0
SUM OF ALL RESPONSES TO PHQ9 QUESTIONS 1 & 2: 0

## 2021-06-10 ASSESSMENT — LIFESTYLE VARIABLES
HOW MANY STANDARD DRINKS CONTAINING ALCOHOL DO YOU HAVE ON A TYPICAL DAY: 1 OR 2
HOW OFTEN DO YOU HAVE A DRINK CONTAINING ALCOHOL: NEVER

## 2021-06-10 ASSESSMENT — PULMONARY FUNCTION TESTS
FEV1_PERCENT_PREDICTED_PRE: 78
FEV1/FVC_PRE: 73

## 2021-06-10 ASSESSMENT — SOCIAL DETERMINANTS OF HEALTH (SDOH): HOW HARD IS IT FOR YOU TO PAY FOR THE VERY BASICS LIKE FOOD, HOUSING, MEDICAL CARE, AND HEATING?: NOT HARD AT ALL

## 2021-06-10 NOTE — ED NOTES
Bed: H4  Expected date: 6/10/21  Expected time:   Means of arrival:   Comments:  [address], RN  06/10/21 0510

## 2021-06-10 NOTE — PROGRESS NOTES
OFFICE PROGRESS NOTE      SUBJECTIVE:        Patient ID:   Farhat Gonzalez is a 56 y.o. female who presents for   Chief Complaint   Patient presents with    Pain     L side below rib for the past couple years           HPI:     FEELS PAIN BELOW THE RIB AREA ON THE LEFT SIDE. HAD GI EVALUATION DONE BY DR. Fabiano Cordero. NO POSITIVE FINDING NOTED. ALSO FEELS SHORTNESS OF BREATH AT TIMES. WATCHING DIET GOOD. WALKING SOME FOR EXERCISE. TAKING MEDICATIONS REGULARLY. Prior to Admission medications    Medication Sig Start Date End Date Taking?  Authorizing Provider   albuterol sulfate  (90 Base) MCG/ACT inhaler Inhale 1 puff into the lungs every 4 hours as needed for Wheezing 6/10/21  Yes Erica Mcnally MD   omeprazole (PRILOSEC) 20 MG delayed release capsule Take 20 mg by mouth daily   Yes Historical Provider, MD   folic acid (FOLVITE) 1 MG tablet Take 1 tablet by mouth daily 3/26/21 3/21/22 Yes FERNANDO Carson - CNP   aspirin EC 81 MG EC tablet Take 81 mg by mouth daily Last dose 5/29   Yes Historical Provider, MD     Social History     Socioeconomic History    Marital status: Single     Spouse name: None    Number of children: None    Years of education: None    Highest education level: None   Occupational History    None   Tobacco Use    Smoking status: Never Smoker    Smokeless tobacco: Never Used   Vaping Use    Vaping Use: Never used   Substance and Sexual Activity    Alcohol use: No    Drug use: No    Sexual activity: None   Other Topics Concern    None   Social History Narrative    None     Social Determinants of Health     Financial Resource Strain: Low Risk     Difficulty of Paying Living Expenses: Not hard at all   Food Insecurity: No Food Insecurity    Worried About Running Out of Food in the Last Year: Never true    Josephine of Food in the Last Year: Never true   Transportation Needs: No Transportation Needs    Lack of Transportation (Medical): No    Lack of Transportation (Non-Medical): No   Physical Activity:     Days of Exercise per Week:     Minutes of Exercise per Session:    Stress:     Feeling of Stress :    Social Connections:     Frequency of Communication with Friends and Family:     Frequency of Social Gatherings with Friends and Family:     Attends Sabianist Services:     Active Member of Clubs or Organizations:     Attends Club or Organization Meetings:     Marital Status:    Intimate Partner Violence:     Fear of Current or Ex-Partner:     Emotionally Abused:     Physically Abused:     Sexually Abused:        I have reviewed Leahs allergies, medications, problem list, medical, social and family history and have updated as needed in the electronic medical record  Review Of Systems:    Skin: no abnormal pigmentation, rash, scaling, itching, masses, hair or nail changes  Eyes: no blurring, diplopia, or eye pain  Ears/Nose/Throat: no hearing loss, tinnitus, vertigo, nosebleed, nasal congestion, rhinorrhea, sore throat  Respiratory: no cough, pleuritic chest pain, dyspnea, or wheezing  Cardiovascular: no chest pain, angina, dyspnea on exertion, orthopnea, PND, palpitations, or claudication  Gastrointestinal: no nausea, vomiting, heartburn, diarrhea, constipation, bloating,  abdominal pain, or blood per rectum. Appetite is good  Genitourinary: no urinary urgency, frequency, dysuria, nocturia, hesitancy, or incontinence  Musculoskeletal: joint pains off and on. Morning stiffness.  Ambulating well  Neurologic: no paralysis, paresis, paresthesia, seizures, tremors, or headaches  Hematologic/Lymphatic/Immunologic: no anemia, abnormal bleeding/bruising, fever, chills, night sweats, swollen glands, or unexplained weight loss  Endocrine: no heat or cold intolerance and no polyphagia, polydipsia, or polyuria        OBJECTIVE:     VS:  Wt Readings from Last 3 Encounters:   06/10/21 187 lb (84.8 kg)   06/03/21 193 lb (87.5 kg)   01/03/21 195 lb (88.5 kg)     Temp Readings from Last 3 Encounters:   06/10/21 97.3 °F (36.3 °C)   06/03/21 98.5 °F (36.9 °C) (Temporal)   01/03/21 97.5 °F (36.4 °C) (Temporal)     BP Readings from Last 3 Encounters:   06/10/21 122/86   06/03/21 (!) 150/94   06/03/21 (!) 149/100        General appearance: Alert, Awake, Oriented times 3, no distress  Skin: Warm and dry  Head: Normocephalic. No masses, lesions or tenderness noted  Eyes: Conjunctivae appear normal. PERLE  Ears: External ears normal  Nose/Sinuses: Nares normal. Septum midline. Mucosa normal. No drainage  Oropharynx: Oropharynx clear with no exudate seen  Neck: Neck supple. No jugular venous distension, lymphadenopathy or thyromegaly Trachea midline  Chest:  Normal. Movements are Normal and Equal.  Lungs: Lungs clear to auscultation bilaterally. No ronchi, crackles or wheezes  Heart: S1 S2  Regular rate and rhythm. No rub, murmur or gallop  Abdomen: Abdomen soft, non-tender. BS normal. No masses, organomegaly. Back: Grossly Normal and Equal. DTR are Normal. SLR is Normal.  Extremities: Arthritic changes are noted. Movements are limited. Pedal pulses are normal.  Musculoskeletal: Muscular strength appears intact. No joint effusion, tenderness, swelling or warmth  Neuro:  No focal motor or sensory deficits        ASSESSMENT     Patient Active Problem List    Diagnosis Date Noted    Lumbar strain 11/12/2013    Obesity (BMI 30.0-34.9) 11/12/2013    Primary hypercholesterolemia 06/13/2013    Mild chronic obstructive pulmonary disease ([facility] [address] 06/10/2021    H/O: gout 06/10/2021    Allergic rhinitis due to allergen 02/11/2020        Diagnosis:     ICD-10-CM    1. Primary hypercholesterolemia  E78.00 Comprehensive Metabolic Panel     Lipid Panel   2. Obesity (BMI 30.0-34. 9)  E66.9    3. Shortness of breath on exertion  R06.02 XR CHEST (2 VW)     Spirometry With OR Without Bronchodilator   4. H/O: gout  Z87.39 Uric Acid   5.  Hypovitaminosis D

## 2021-06-10 NOTE — ED TRIAGE NOTES
FIRST PROVIDER CONTACT ASSESSMENT NOTE      Department of Emergency Medicine   Admit Date: No admission date for patient encounter. Chief Complaint: Shortness of Breath (SOB on exertion, states has gotten worse over the last week, seen pcp today. hx of PE and DVT)      History of Present Illness:    Deandra Garcia is a 56 y.o. female who presents to the ED for shortness of breath. Patient states she is actually been having issues with shortness of breath for at least a year now. Has seen her PCP who thinks it is anxiety related. She states for the past week it significantly worse. Does admit to a history of DVT and PE in 2018. Does not see cardiology on a regular basis. Has never seen pulmonology.         -----------------END OF FIRST PROVIDER CONTACT ASSESSMENT NOTE--------------  Electronically signed by Treasa Gottron, PA   DD: 6/10/21

## 2021-06-10 NOTE — TELEPHONE ENCOUNTER
Brief description of triage: Sob x 2 years  \"I think I should go to the ED\"    Reports sob x 2 years  Reports some intermittent pain in left side below rib   Denies sob at rest, sob when walking \"40 feet out to the road\" with increase in HR over 100  Speaks in full sentences  Denies cp, palpitations, fast heart rate at rest  No lung disease or CAD  No fever  Pulse ox above 90  Takes asa  Recently worked up by GI for abd pain and hiatal hernia  Difficulty with triage process, unable to adequately get through questions r/t interruptions. Caller not satisfied with no ED dispo according to the triage protocol/guidelines    Explained that this was a recommendation only and if she feels this is a medical emergency she should proceed. [facility] and hospital will determine co-pay (if this is a requirement ?, she has been retired from [employer] for 2 years)    Suggested she call her PCP for second level triage/recommendation when office opens. She states she will do so. Triage indicates for patient to call PCP when office opens    Care advice was not discussed as pt ended call with decision to call her PCP    Attention Provider: Thank you for allowing me to participate in the care of your patient. The patient was connected to triage in response to symptoms provided. Please do not respond through this encounter as the response is not directed to a shared pool. Reason for Disposition   [1] MILD longstanding difficulty breathing AND [2]  SAME as normal     Caller states she will call PCP this am when office opens to discuss if she should go to ED    Answer Assessment - Initial Assessment Questions  1. RESPIRATORY STATUS: \"Describe your breathing? \" (e.g., wheezing, shortness of breath, unable to speak, severe coughing)       Gets sob when walking 40 feet  Has had sob for 2 years    2. ONSET: \"When did this breathing problem begin? \"    2 years ago    3.  PATTERN \"Does the difficult breathing come and go, or has it been constant since it started? \"    varies over last 2 years    4. SEVERITY: \"How bad is your breathing? \" (e.g., mild, moderate, severe)     - MILD: No SOB at rest, mild SOB with walking, speaks normally in sentences, can lay down, no retractions, pulse < 100. States her pulse goes over 100 when walking 40 feet \"out to the road\"     5. RECURRENT SYMPTOM: \"Have you had difficulty breathing before? \" If so, ask: \"When was the last time? \" and \"What happened that time? \"     Yes 2 years    6. CARDIAC HISTORY: \"Do you have any history of heart disease? \" (e.g., heart attack, angina, bypass surgery, angioplasty)       *No Answer*  7. LUNG HISTORY: \"Do you have any history of lung disease? \"  (e.g., pulmonary embolus, asthma, emphysema)   had a blood clot 2 yrs ago    8. CAUSE: \"What do you think is causing the breathing problem? \"       Unknown. Has had GI workup for hiatal hernia, allergy workup, no cause found    9. OTHER SYMPTOMS: \"Do you have any other symptoms? (e.g., dizziness, runny nose, cough, chest pain, fever)  Just a bit of pain left rib. No cp,    10. PREGNANCY: \"Is there any chance you are pregnant? \" \"When was your last menstrual period? \"      n/a  11. TRAVEL: \"Have you traveled out of the country in the last month? \" (e.g., travel history, exposures)      No prolong bedrest or illness    Protocols used: BREATHING DIFFICULTY-ADULT-AH

## 2021-06-10 NOTE — PATIENT INSTRUCTIONS
LOW SALT,LOW CARB. AND LOW FAT DIET. CONTINUE CURRENT MEDICATIONS TAKING REGULARLY. USE ALBUTEROL INHALER 2 PUFFS 4 TIMES A DAY AS NEEDED  REGULAR WALKING ADVISED. ADVISED WEIGHT REDUCTION. XR CHEST AS ADVISED   FASTING FOR LAB WORK ONE MORNING. KEEP NEXT APPOINTMENT IN 1 WEEK.

## 2021-06-11 LAB
BASOPHILS ABSOLUTE: 0.03 E9/L (ref 0–0.2)
BASOPHILS RELATIVE PERCENT: 0.4 % (ref 0–2)
EOSINOPHILS ABSOLUTE: 0.12 E9/L (ref 0.05–0.5)
EOSINOPHILS RELATIVE PERCENT: 1.7 % (ref 0–6)
HCT VFR BLD CALC: 39.5 % (ref 34–48)
HEMOGLOBIN: 12.8 G/DL (ref 11.5–15.5)
IMMATURE GRANULOCYTES #: 0.02 E9/L
IMMATURE GRANULOCYTES %: 0.3 % (ref 0–5)
LV EF: 75 %
LVEF MODALITY: NORMAL
LYMPHOCYTES ABSOLUTE: 1.76 E9/L (ref 1.5–4)
LYMPHOCYTES RELATIVE PERCENT: 24.5 % (ref 20–42)
MCH RBC QN AUTO: 30 PG (ref 26–35)
MCHC RBC AUTO-ENTMCNC: 32.4 % (ref 32–34.5)
MCV RBC AUTO: 92.7 FL (ref 80–99.9)
MONOCYTES ABSOLUTE: 0.72 E9/L (ref 0.1–0.95)
MONOCYTES RELATIVE PERCENT: 10 % (ref 2–12)
NEUTROPHILS ABSOLUTE: 4.52 E9/L (ref 1.8–7.3)
NEUTROPHILS RELATIVE PERCENT: 63.1 % (ref 43–80)
PDW BLD-RTO: 13.2 FL (ref 11.5–15)
PLATELET # BLD: 277 E9/L (ref 130–450)
PMV BLD AUTO: 10.9 FL (ref 7–12)
RBC # BLD: 4.26 E12/L (ref 3.5–5.5)
WBC # BLD: 7.2 E9/L (ref 4.5–11.5)

## 2021-06-11 PROCEDURE — 94640 AIRWAY INHALATION TREATMENT: CPT

## 2021-06-11 PROCEDURE — 6370000000 HC RX 637 (ALT 250 FOR IP): Performed by: FAMILY MEDICINE

## 2021-06-11 PROCEDURE — 99222 1ST HOSP IP/OBS MODERATE 55: CPT | Performed by: FAMILY MEDICINE

## 2021-06-11 PROCEDURE — G0378 HOSPITAL OBSERVATION PER HR: HCPCS

## 2021-06-11 PROCEDURE — 93306 TTE W/DOPPLER COMPLETE: CPT

## 2021-06-11 PROCEDURE — 94760 N-INVAS EAR/PLS OXIMETRY 1: CPT

## 2021-06-11 PROCEDURE — 85025 COMPLETE CBC W/AUTO DIFF WBC: CPT

## 2021-06-11 PROCEDURE — 36415 COLL VENOUS BLD VENIPUNCTURE: CPT

## 2021-06-11 RX ORDER — ALBUTEROL SULFATE 2.5 MG/3ML
2.5 SOLUTION RESPIRATORY (INHALATION) EVERY 4 HOURS PRN
Status: DISCONTINUED | OUTPATIENT
Start: 2021-06-11 | End: 2021-06-13 | Stop reason: HOSPADM

## 2021-06-11 RX ORDER — ASPIRIN 81 MG/1
81 TABLET ORAL DAILY
Status: DISCONTINUED | OUTPATIENT
Start: 2021-06-11 | End: 2021-06-11

## 2021-06-11 RX ORDER — FOLIC ACID 1 MG/1
1 TABLET ORAL DAILY
Status: DISCONTINUED | OUTPATIENT
Start: 2021-06-11 | End: 2021-06-13 | Stop reason: HOSPADM

## 2021-06-11 RX ORDER — ASPIRIN 81 MG/1
81 TABLET ORAL DAILY
Status: DISCONTINUED | OUTPATIENT
Start: 2021-06-11 | End: 2021-06-13 | Stop reason: HOSPADM

## 2021-06-11 RX ORDER — M-VIT,TX,IRON,MINS/CALC/FOLIC 27MG-0.4MG
1 TABLET ORAL DAILY
COMMUNITY

## 2021-06-11 RX ORDER — ALBUTEROL SULFATE 90 UG/1
1 AEROSOL, METERED RESPIRATORY (INHALATION) EVERY 4 HOURS PRN
Status: DISCONTINUED | OUTPATIENT
Start: 2021-06-11 | End: 2021-06-11 | Stop reason: CLARIF

## 2021-06-11 RX ORDER — M-VIT,TX,IRON,MINS/CALC/FOLIC 27MG-0.4MG
1 TABLET ORAL DAILY
Status: DISCONTINUED | OUTPATIENT
Start: 2021-06-11 | End: 2021-06-11

## 2021-06-11 RX ORDER — M-VIT,TX,IRON,MINS/CALC/FOLIC 27MG-0.4MG
1 TABLET ORAL DAILY
Status: DISCONTINUED | OUTPATIENT
Start: 2021-06-11 | End: 2021-06-13 | Stop reason: HOSPADM

## 2021-06-11 RX ORDER — IPRATROPIUM BROMIDE AND ALBUTEROL SULFATE 2.5; .5 MG/3ML; MG/3ML
1 SOLUTION RESPIRATORY (INHALATION) 4 TIMES DAILY
Status: DISCONTINUED | OUTPATIENT
Start: 2021-06-11 | End: 2021-06-13 | Stop reason: HOSPADM

## 2021-06-11 RX ORDER — PANTOPRAZOLE SODIUM 40 MG/1
40 TABLET, DELAYED RELEASE ORAL
Status: DISCONTINUED | OUTPATIENT
Start: 2021-06-11 | End: 2021-06-11

## 2021-06-11 RX ORDER — FOLIC ACID 1 MG/1
1 TABLET ORAL DAILY
Status: DISCONTINUED | OUTPATIENT
Start: 2021-06-11 | End: 2021-06-11

## 2021-06-11 RX ORDER — PANTOPRAZOLE SODIUM 40 MG/1
40 TABLET, DELAYED RELEASE ORAL
Status: DISCONTINUED | OUTPATIENT
Start: 2021-06-11 | End: 2021-06-13 | Stop reason: HOSPADM

## 2021-06-11 RX ADMIN — Medication 1 TABLET: at 17:38

## 2021-06-11 RX ADMIN — APIXABAN 10 MG: 5 TABLET, FILM COATED ORAL at 01:53

## 2021-06-11 RX ADMIN — PANTOPRAZOLE SODIUM 40 MG: 40 TABLET, DELAYED RELEASE ORAL at 17:38

## 2021-06-11 RX ADMIN — APIXABAN 10 MG: 5 TABLET, FILM COATED ORAL at 19:40

## 2021-06-11 RX ADMIN — APIXABAN 10 MG: 5 TABLET, FILM COATED ORAL at 08:37

## 2021-06-11 RX ADMIN — ASPIRIN 81 MG: 81 TABLET, FILM COATED ORAL at 17:38

## 2021-06-11 RX ADMIN — IPRATROPIUM BROMIDE AND ALBUTEROL SULFATE 1 AMPULE: .5; 3 SOLUTION RESPIRATORY (INHALATION) at 14:20

## 2021-06-11 RX ADMIN — IPRATROPIUM BROMIDE AND ALBUTEROL SULFATE 1 AMPULE: .5; 3 SOLUTION RESPIRATORY (INHALATION) at 06:22

## 2021-06-11 RX ADMIN — IPRATROPIUM BROMIDE AND ALBUTEROL SULFATE 1 AMPULE: .5; 3 SOLUTION RESPIRATORY (INHALATION) at 18:44

## 2021-06-11 RX ADMIN — FOLIC ACID 1 MG: 1 TABLET ORAL at 17:38

## 2021-06-11 RX ADMIN — IPRATROPIUM BROMIDE AND ALBUTEROL SULFATE 1 AMPULE: .5; 3 SOLUTION RESPIRATORY (INHALATION) at 11:00

## 2021-06-11 ASSESSMENT — PAIN SCALES - GENERAL
PAINLEVEL_OUTOF10: 0

## 2021-06-11 NOTE — PLAN OF CARE
Problem: Falls - Risk of:  Goal: Will remain free from falls  Outcome: Met This Shift  Goal: Absence of physical injury  Outcome: Met This Shift     Problem: Breathing Pattern - Ineffective:  Goal: Ability to achieve and maintain a regular respiratory rate will improve  Outcome: Met This Shift

## 2021-06-11 NOTE — H&P
MCG/ACT inhaler, Inhale 1 puff into the lungs every 4 hours as needed for Wheezing    Allergies:    Amoxicillin    Social History:    reports that she has never smoked. She has never used smokeless tobacco. She reports that she does not drink alcohol and does not use drugs. Family History:   family history includes Cancer in her father and mother; Heart Disease in her father, maternal aunt, maternal uncle, paternal aunt, and paternal uncle; High Blood Pressure in her father. REVIEW OF SYSTEMS:  As above in the HPI, otherwise negative    PHYSICAL EXAM:    Vitals:  /70   Pulse 78   Temp 97 °F (36.1 °C) (Oral)   Resp 18   Ht 5' 5\" (1.651 m)   Wt 193 lb (87.5 kg)   LMP 08/15/2010 (Exact Date)   SpO2 98%   BMI 32.12 kg/m²     General:  Awake, alert, oriented X 3. Well developed, well nourished, well groomed. No apparent distress. HEENT:  Normocephalic, atraumatic. Pupils equal, round, reactive to light. No scleral icterus. No conjunctival injection. Normal lips, teeth, and gums. No nasal discharge. Neck:  Supple  Heart:  RRR, no murmurs, gallops, rubs  Lungs:  CTA bilaterally, bilat symmetrical expansion, no wheeze, rales, or rhonchi  Abdomen:   Bowel sounds present, soft, nontender, no masses, no organomegaly, no peritoneal signs  Extremities:  No clubbing, cyanosis, or edema  Skin:  Warm and dry, no open lesions or rash  Neuro:  Cranial nerves 2-12 intact, no focal deficits  Breast: deferred  Rectal: deferred  Genitalia:  deferred    LABS:    CBC:   Lab Results   Component Value Date    WBC 7.0 06/10/2021    RBC 4.58 06/10/2021    HGB 13.7 06/10/2021    HCT 42.0 06/10/2021    MCV 91.7 06/10/2021    MCH 29.9 06/10/2021    MCHC 32.6 06/10/2021    RDW 13.2 06/10/2021     06/10/2021    MPV 10.6 06/10/2021     CMP:    Lab Results   Component Value Date     06/10/2021    K 4.0 06/10/2021     06/10/2021    CO2 21 06/10/2021    BUN 10 06/10/2021    CREATININE 0.9 06/10/2021 GFRAA >60 06/10/2021    LABGLOM >60 06/10/2021    GLUCOSE 103 06/10/2021    GLUCOSE 84 11/12/2010    PROT 7.4 06/10/2021    LABALBU 4.4 06/10/2021    LABALBU 4.3 11/12/2010    CALCIUM 10.2 06/10/2021    BILITOT 0.4 06/10/2021    ALKPHOS 123 06/10/2021    AST 15 06/10/2021    ALT 13 06/10/2021     CTA CHEST:  Impression:     Multiple bilateral pulmonary emboli to the right upper lobe, right lower   lobe, right middle lobe, left upper lobe, and left lower lobe.  Near   occlusive emboli to the left upper lobe and right lower lobe.  No evidence of   RV strain. Nonspecific pretracheal retrocaval node measuring approximately 1.8 cm in   diameter. ASSESSMENT:      Active Hospital Problems    Diagnosis Date Noted    Obesity (BMI 30.0-34. 9) [E66.9] 11/12/2013     Priority: High     Class: Chronic    Primary hypercholesterolemia [E78.00] 06/13/2013     Priority: High     Class: Chronic    Pulmonary embolism ([address] [I26.99] 06/10/2021    Mild chronic obstructive pulmonary disease ([address] [J44.9] 06/10/2021       PLAN:  ADMIT, MONITOR, ELIQUIS, PULMONARY EVALUATION.     Gabrielle Miller MD  12:28 PM  6/11/2021

## 2021-06-11 NOTE — CARE COORDINATION
6/11/2021 1129 CM note: NEGATIVE COVID 6/10/21. Met with patient for transition of care needs. Pt resides alone in ranch home however she has been staying with and assisting with her sister who has M.S. Pt is independent with ADLS,drives. No hx DME/HHC/SNF. PCP is Dr Joe Rouse and she uses [facility] or [facility]. Pt on eliquis. Free 30 day trial and $10.00 copay cards for  eliquis explained and given to patient. Patient instructed to take this card with  prescription to retail pharmacy to obtain 30 day supply for free. Pt agreeable to meds for beds if applicable with [facility] hours. Eliquis teaching initiated and literature given/reviewed. Patient verbalized understanding and gratitude. Plan is for pt to return home, her brother will provide ride.  Shannon SEPULVEDA

## 2021-06-11 NOTE — ED NOTES
Nurse to nurse report given to [facility] in the [address], RN  06/11/21 1376

## 2021-06-11 NOTE — CONSULTS
- 450 E9/L Final   09/11/2019 316 130 - 450 E9/L Final     Sodium   Date Value Ref Range Status   06/10/2021 143 132 - 146 mmol/L Final   02/03/2020 143 132 - 146 mmol/L Final   09/11/2019 143 132 - 146 mmol/L Final     Potassium   Date Value Ref Range Status   02/03/2020 4.4 3.5 - 5.0 mmol/L Final   09/11/2019 4.3 3.5 - 5.0 mmol/L Final   01/14/2019 5.1 (H) 3.5 - 5.0 mmol/L Final     Comment:     Specimen severely Hemolyzed.  Result may be artificially increased     Potassium reflex Magnesium   Date Value Ref Range Status   06/10/2021 4.0 3.5 - 5.0 mmol/L Final   05/02/2018 3.5 3.5 - 5.0 mmol/L Final     Chloride   Date Value Ref Range Status   06/10/2021 108 (H) 98 - 107 mmol/L Final   02/03/2020 105 98 - 107 mmol/L Final   09/11/2019 105 98 - 107 mmol/L Final     CO2   Date Value Ref Range Status   06/10/2021 21 (L) 22 - 29 mmol/L Final   02/03/2020 21 (L) 22 - 29 mmol/L Final   09/11/2019 25 22 - 29 mmol/L Final     BUN   Date Value Ref Range Status   06/10/2021 10 6 - 23 mg/dL Final   02/03/2020 10 8 - 23 mg/dL Final   09/11/2019 18 8 - 23 mg/dL Final     CREATININE   Date Value Ref Range Status   06/10/2021 0.9 0.5 - 1.0 mg/dL Final   02/03/2020 0.9 0.5 - 1.0 mg/dL Final   09/11/2019 0.8 0.5 - 1.0 mg/dL Final     Glucose   Date Value Ref Range Status   06/10/2021 103 (H) 74 - 99 mg/dL Final   02/03/2020 102 (H) 74 - 99 mg/dL Final   09/11/2019 114 (H) 74 - 99 mg/dL Final   11/12/2010 84 70 - 110 mg/dL Final     Calcium   Date Value Ref Range Status   06/10/2021 10.2 8.6 - 10.2 mg/dL Final   02/03/2020 10.2 8.6 - 10.2 mg/dL Final   09/11/2019 9.5 8.6 - 10.2 mg/dL Final     Total Protein   Date Value Ref Range Status   06/10/2021 7.4 6.4 - 8.3 g/dL Final   02/03/2020 7.7 6.4 - 8.3 g/dL Final   09/11/2019 7.0 6.4 - 8.3 g/dL Final     Albumin   Date Value Ref Range Status   06/10/2021 4.4 3.5 - 5.2 g/dL Final   02/03/2020 4.5 3.5 - 5.2 g/dL Final   09/11/2019 4.3 3.5 - 5.2 g/dL Final   11/12/2010 4.3 3.2 - 4.8 g/dL lobe.  No evidence of   RV strain. Nonspecific pretracheal retrocaval node measuring approximately 1.8 cm in   diameter. No airspace disease or pleural effusions. Findings reported to Dr. Ofelia Michel at 8:59 p.m. XR CHEST (2 VW)   Final Result   Atherosclerotic disease. No additional evidence of active cardiopulmonary   pathology. PROBLEM LIST:  Active Problems:    Pulmonary embolism (HCC)  Resolved Problems:    * No resolved hospital problems. *      IMPRESSION:  1. Bilateral pulmonary embolism no evidence of right heart strain  2. Factor V Leiden not on anticoagulation  3. Hyperlipidemia    PLAN:  1. Continue Eliquis for anticoagulation  2. Bronchodilators as needed for shortness of breath  3. Monitor for hypoxia and use oxygen as needed      Electronically signed by FERNANDO Alvarado CNP on 6/11/2021 at 6:59 AM      ATTESTATION:  ICU Staff Physician note of personal involvement in Care  As the attending physician, I certify that I personally reviewed the patients history and personnally examined the patient to confirm the physical findings described above,  And that I reviewed the relevant imaging studies and available reports.  I also discussed the differential diagnosis and all of the proposed management plans with the patient and individuals accompanying the patient to this visit.  They had the opportunity to ask questions about the proposed management plans and to have those questions answered.      56year old woman with PMH as described below admitted to hospital for management of pulmonary embolism. --She is on room air, no chest pain  --She got immunized with Pan Sour vaccine in March, no thrombocytopenia, not suggestive of VITTS. I recommended the patient to log this episode in [facility].     A/P:  Pulmonary embolism in a patient with Factor V Leiden  --Anticoagulation with  DOAC  --On room air  --Mild LVH on echocardiogram; normal RV  --No strenous exercise in 3-6

## 2021-06-12 LAB
BASOPHILS ABSOLUTE: 0.03 E9/L (ref 0–0.2)
BASOPHILS RELATIVE PERCENT: 0.4 % (ref 0–2)
EOSINOPHILS ABSOLUTE: 0.34 E9/L (ref 0.05–0.5)
EOSINOPHILS RELATIVE PERCENT: 4.9 % (ref 0–6)
HCT VFR BLD CALC: 39.2 % (ref 34–48)
HEMOGLOBIN: 12.8 G/DL (ref 11.5–15.5)
IMMATURE GRANULOCYTES #: 0.02 E9/L
IMMATURE GRANULOCYTES %: 0.3 % (ref 0–5)
LYMPHOCYTES ABSOLUTE: 1.91 E9/L (ref 1.5–4)
LYMPHOCYTES RELATIVE PERCENT: 27.2 % (ref 20–42)
MCH RBC QN AUTO: 30.4 PG (ref 26–35)
MCHC RBC AUTO-ENTMCNC: 32.7 % (ref 32–34.5)
MCV RBC AUTO: 93.1 FL (ref 80–99.9)
MONOCYTES ABSOLUTE: 0.61 E9/L (ref 0.1–0.95)
MONOCYTES RELATIVE PERCENT: 8.7 % (ref 2–12)
NEUTROPHILS ABSOLUTE: 4.1 E9/L (ref 1.8–7.3)
NEUTROPHILS RELATIVE PERCENT: 58.5 % (ref 43–80)
PDW BLD-RTO: 13.3 FL (ref 11.5–15)
PLATELET # BLD: 260 E9/L (ref 130–450)
PMV BLD AUTO: 11.1 FL (ref 7–12)
RBC # BLD: 4.21 E12/L (ref 3.5–5.5)
WBC # BLD: 7 E9/L (ref 4.5–11.5)

## 2021-06-12 PROCEDURE — 94640 AIRWAY INHALATION TREATMENT: CPT

## 2021-06-12 PROCEDURE — 36415 COLL VENOUS BLD VENIPUNCTURE: CPT

## 2021-06-12 PROCEDURE — 99232 SBSQ HOSP IP/OBS MODERATE 35: CPT | Performed by: FAMILY MEDICINE

## 2021-06-12 PROCEDURE — 6370000000 HC RX 637 (ALT 250 FOR IP): Performed by: FAMILY MEDICINE

## 2021-06-12 PROCEDURE — G0378 HOSPITAL OBSERVATION PER HR: HCPCS

## 2021-06-12 PROCEDURE — 85025 COMPLETE CBC W/AUTO DIFF WBC: CPT

## 2021-06-12 RX ADMIN — IPRATROPIUM BROMIDE AND ALBUTEROL SULFATE 1 AMPULE: .5; 3 SOLUTION RESPIRATORY (INHALATION) at 12:57

## 2021-06-12 RX ADMIN — IPRATROPIUM BROMIDE AND ALBUTEROL SULFATE 1 AMPULE: .5; 3 SOLUTION RESPIRATORY (INHALATION) at 06:26

## 2021-06-12 RX ADMIN — Medication 1 TABLET: at 17:09

## 2021-06-12 RX ADMIN — APIXABAN 10 MG: 5 TABLET, FILM COATED ORAL at 19:39

## 2021-06-12 RX ADMIN — IPRATROPIUM BROMIDE AND ALBUTEROL SULFATE 1 AMPULE: .5; 3 SOLUTION RESPIRATORY (INHALATION) at 10:00

## 2021-06-12 RX ADMIN — APIXABAN 10 MG: 5 TABLET, FILM COATED ORAL at 09:09

## 2021-06-12 RX ADMIN — FOLIC ACID 1 MG: 1 TABLET ORAL at 17:08

## 2021-06-12 RX ADMIN — IPRATROPIUM BROMIDE AND ALBUTEROL SULFATE 1 AMPULE: .5; 3 SOLUTION RESPIRATORY (INHALATION) at 19:23

## 2021-06-12 RX ADMIN — PANTOPRAZOLE SODIUM 40 MG: 40 TABLET, DELAYED RELEASE ORAL at 17:09

## 2021-06-12 RX ADMIN — ASPIRIN 81 MG: 81 TABLET, FILM COATED ORAL at 17:09

## 2021-06-12 ASSESSMENT — ENCOUNTER SYMPTOMS
NAUSEA: 0
EYE REDNESS: 0
VOMITING: 0
SHORTNESS OF BREATH: 1
ABDOMINAL PAIN: 0

## 2021-06-12 ASSESSMENT — PAIN SCALES - GENERAL
PAINLEVEL_OUTOF10: 0

## 2021-06-12 NOTE — PROGRESS NOTES
PULMONARY MEDICINE FOLLOW UP      56year old woman with PMH of pulmoanry embolism, factor V Leiden, and as described below admitted to hospital for management of pulmonary embolism.     --Remains on room air. Has dyspnea and hypoxemia when she walks  --Has a left subcostal discomfort for the last 2 years, worse recently. In CT scan there is an abnormal round area in the corresponding area, described as possible scarring. I recommended her follow up with outpatient pulmonary for evaluation of that area and the mediastinal adenopathy. She is very worried about that discomfort as she feels there is \"something else\" going on. I explained her how she would get further evaluation with CT to follow the progression of the abnormality, possible biopsy if persistent or increasing in size, also I explained her how we cannot obtain a biopsy of the area without ensuring it will not reduce its size in the next few weeks as a result of inflammation. We would need to stop anticoagulation and it is usually not indicated to do so in the first month after a pulmonary embolism. --We had an extensive discussion on how her body will dissolve the clots and the anticoagulation prevents further clot formation.   Also, how she needs her heart to rest and only walk at a comfortable pace for exercise     BP (!) 126/92   Pulse 98   Temp 97.9 °F (36.6 °C) (Oral)   Resp 19   Ht 5' 5\" (1.651 m)   Wt 193 lb (87.5 kg)   LMP 08/15/2010 (Exact Date)   SpO2 96%   BMI 32.12 kg/m²   General: Awake, oriented to place, time and person  HEENT: No head lesions, PERRL, EOMI, mouth without lesions, no nasal lesions, no cervical adenopathy palpated  Respiratory: Lungs with equal breath sounds bilaterally, no adventitious sounds auscultated, no accessory muscle use  CV: Regular rate, no murmurs, no JVD, no leg edema  Abdomen: Soft, non tender, + bowel sounds, no lesions  Skin: Hydrated, adequate turgor, no rash, capillary refill <2 seconds  Extremities: Muscular strength 5/5 in 4 limbs, moves 4 limbs spontaneously, distal pulses present  Neurology: Awake and alert, follows commands, moves 4 limbs on command and spontaneously,  neck is supple, no meningitic signs present. A/P:  Pulmonary embolism in a patient with Factor V Leiden  --Anticoagulation with  DOAC. I recommend lifelong anticoagulation if there are no contraindications or complications arousing from it. --On room air but may need oxygen when exerting  --Mild LVH on echocardiogram; normal RV  --No strenous exercise in 3-6 months     Diastolic dysfunction   --Needs evaluation for hypertension  --I recommended her to buy a blood pressure cuff and monitor at home and to have follow up with PCP.      Abnormal mediastinal adenopathy and left lower lobe nodule/scarring?  --Needs CT chest in 4 weeks to follow up, may need biopsy. Recommendations for discharge  --The patient will need exercise oximetry prior to discharge  --Bronchodilators:  Albuterol PRN   --Please refer to outpatient pulmonology as she will need follow up with CT scan         MEDICATIONS:   apixaban  10 mg Oral BID    ipratropium-albuterol  1 ampule Inhalation 4x daily    therapeutic multivitamin-minerals  1 tablet Oral Daily    pantoprazole  40 mg Oral Dinner    folic acid  1 mg Oral Daily    aspirin EC  81 mg Oral Daily       albuterol, perflutren lipid microspheres    OBJECTIVE:  Vitals:    06/12/21 1700   BP: 126/86   Pulse: 98   Resp: 19   Temp: 97 °F (36.1 °C)   SpO2:            O2 Device: None (Room air)        LABS:  WBC   Date Value Ref Range Status   06/12/2021 7.0 4.5 - 11.5 E9/L Final   06/11/2021 7.2 4.5 - 11.5 E9/L Final   06/10/2021 7.0 4.5 - 11.5 E9/L Final     Hemoglobin   Date Value Ref Range Status   06/12/2021 12.8 11.5 - 15.5 g/dL Final   06/11/2021 12.8 11.5 - 15.5 g/dL Final   06/10/2021 13.7 11.5 - 15.5 g/dL Final     Hematocrit   Date Value Ref Range Status   06/12/2021 39.2 34.0 - 48.0 % Final   06/11/2021 39.5 34.0 - 48.0 % Final   06/10/2021 42.0 34.0 - 48.0 % Final     MCV   Date Value Ref Range Status   06/12/2021 93.1 80.0 - 99.9 fL Final   06/11/2021 92.7 80.0 - 99.9 fL Final   06/10/2021 91.7 80.0 - 99.9 fL Final     Platelets   Date Value Ref Range Status   06/12/2021 260 130 - 450 E9/L Final   06/11/2021 277 130 - 450 E9/L Final   06/10/2021 287 130 - 450 E9/L Final     Sodium   Date Value Ref Range Status   06/10/2021 143 132 - 146 mmol/L Final   02/03/2020 143 132 - 146 mmol/L Final   09/11/2019 143 132 - 146 mmol/L Final     Potassium   Date Value Ref Range Status   02/03/2020 4.4 3.5 - 5.0 mmol/L Final   09/11/2019 4.3 3.5 - 5.0 mmol/L Final   01/14/2019 5.1 (H) 3.5 - 5.0 mmol/L Final     Comment:     Specimen severely Hemolyzed.  Result may be artificially increased     Potassium reflex Magnesium   Date Value Ref Range Status   06/10/2021 4.0 3.5 - 5.0 mmol/L Final   05/02/2018 3.5 3.5 - 5.0 mmol/L Final     Chloride   Date Value Ref Range Status   06/10/2021 108 (H) 98 - 107 mmol/L Final   02/03/2020 105 98 - 107 mmol/L Final   09/11/2019 105 98 - 107 mmol/L Final     CO2   Date Value Ref Range Status   06/10/2021 21 (L) 22 - 29 mmol/L Final   02/03/2020 21 (L) 22 - 29 mmol/L Final   09/11/2019 25 22 - 29 mmol/L Final     BUN   Date Value Ref Range Status   06/10/2021 10 6 - 23 mg/dL Final   02/03/2020 10 8 - 23 mg/dL Final   09/11/2019 18 8 - 23 mg/dL Final     CREATININE   Date Value Ref Range Status   06/10/2021 0.9 0.5 - 1.0 mg/dL Final   02/03/2020 0.9 0.5 - 1.0 mg/dL Final   09/11/2019 0.8 0.5 - 1.0 mg/dL Final     Glucose   Date Value Ref Range Status   06/10/2021 103 (H) 74 - 99 mg/dL Final   02/03/2020 102 (H) 74 - 99 mg/dL Final   09/11/2019 114 (H) 74 - 99 mg/dL Final   11/12/2010 84 70 - 110 mg/dL Final     Calcium   Date Value Ref Range Status   06/10/2021 10.2 8.6 - 10.2 mg/dL Final   02/03/2020 10.2 8.6 - 10.2 mg/dL Final   09/11/2019 9.5 8.6 - 10.2 mg/dL Final Total Protein   Date Value Ref Range Status   06/10/2021 7.4 6.4 - 8.3 g/dL Final   02/03/2020 7.7 6.4 - 8.3 g/dL Final   09/11/2019 7.0 6.4 - 8.3 g/dL Final     Albumin   Date Value Ref Range Status   06/10/2021 4.4 3.5 - 5.2 g/dL Final   02/03/2020 4.5 3.5 - 5.2 g/dL Final   09/11/2019 4.3 3.5 - 5.2 g/dL Final   11/12/2010 4.3 3.2 - 4.8 g/dL Final     Total Bilirubin   Date Value Ref Range Status   06/10/2021 0.4 0.0 - 1.2 mg/dL Final   02/03/2020 0.5 0.0 - 1.2 mg/dL Final   09/11/2019 0.3 0.0 - 1.2 mg/dL Final     Alkaline Phosphatase   Date Value Ref Range Status   06/10/2021 123 (H) 35 - 104 U/L Final   02/03/2020 108 (H) 35 - 104 U/L Final   09/11/2019 97 35 - 104 U/L Final     AST   Date Value Ref Range Status   06/10/2021 15 0 - 31 U/L Final   02/03/2020 16 0 - 31 U/L Final   09/11/2019 15 0 - 31 U/L Final     ALT   Date Value Ref Range Status   06/10/2021 13 0 - 32 U/L Final   02/03/2020 12 0 - 32 U/L Final   09/11/2019 13 0 - 32 U/L Final     GFR Non-   Date Value Ref Range Status   06/10/2021 >60 >=60 mL/min/1.73 Final     Comment:     Chronic Kidney Disease: less than 60 ml/min/1.73 sq.m. Kidney Failure: less than 15 ml/min/1.73 sq.m. Results valid for patients 18 years and older. 02/03/2020 >60 >=60 mL/min/1.73 Final     Comment:     Chronic Kidney Disease: less than 60 ml/min/1.73 sq.m. Kidney Failure: less than 15 ml/min/1.73 sq.m. Results valid for patients 18 years and older. 09/11/2019 >60 >=60 mL/min/1.73 Final     Comment:     Chronic Kidney Disease: less than 60 ml/min/1.73 sq.m. Kidney Failure: less than 15 ml/min/1.73 sq.m. Results valid for patients 18 years and older.        GFR    Date Value Ref Range Status   06/10/2021 >60  Final   02/03/2020 >60  Final   09/11/2019 >60  Final     Magnesium   Date Value Ref Range Status   01/14/2019 1.5 (L) 1.6 - 2.6 mg/dL Final   05/02/2018 2.2 1.6 - 2.6 mg/dL Final     No results found for: PHOS  No results for input(s): PH, PO2, PCO2, HCO3, BE, O2SAT in the last 72 hours. RADIOLOGY:  CTA CHEST W CONTRAST   Final Result   Multiple bilateral pulmonary emboli to the right upper lobe, right lower   lobe, right middle lobe, left upper lobe, and left lower lobe. Near   occlusive emboli to the left upper lobe and right lower lobe. No evidence of   RV strain. Nonspecific pretracheal retrocaval node measuring approximately 1.8 cm in   diameter. No airspace disease or pleural effusions. Findings reported to Dr. Mimi Gutierrez at 8:59 p.m. XR CHEST (2 VW)   Final Result   Atherosclerotic disease. No additional evidence of active cardiopulmonary   pathology. PROBLEM LIST:  Principal Problem:    Pulmonary embolism (HCC)  Active Problems:    Primary hypercholesterolemia    Obesity (BMI 30.0-34. 9)    Mild chronic obstructive pulmonary disease (HCC)  Resolved Problems:    * No resolved hospital problems.  *       Irma Arreaga MD  Pulmonary and Critical Care Medicine

## 2021-06-12 NOTE — PROGRESS NOTES
HOSPITAL   PROGRESS NOTE. SUBJECTIVE:  Juan Luis Gomes, 56 y.o., female C/O  STILL FEELS SHORTNESS OF BREATH WITH ACTIVITY. CONDITION DISCUSSED WITH  PATIENT  AND NURSING   STAFF. CONSULT NOTES REVIEWED. ROS:GENERAL- APPETITE- GOOD. BOWEL MOVEMENTS- NORMAL. NO URINE    PROBLEM. EENT: NORMAL            CVS: NORMAL. NO CHEST PAIN OR PALPITATION. RESPIRATORY:NO SOB. GI/: NO ABDOMINAL PAINS            MUSCULOSKELETAL: NO COMPLAIN            CNS: NO  COMPLAIN            OBJECTIVE:    GENERAL:Temperature:  Current - Temp: 97.9 °F (36.6 °C); Max - Temp  Av.8 °F (36.6 °C)  Min: 97 °F (36.1 °C)  Max: 98.6 °F (37 °C)  Respiratory Rate : Resp  Av.4  Min: 18  Max: 20  Pulse Range: Pulse  Av  Min: 78  Max: 92  Blood Presuure Range:  Systolic (22KDN), EJO:303 , Min:104 , FEF:081   ; Diastolic (26OLC), ZZF:53, Min:56, Max:70    Pulse ox Range: SpO2  Av.5 %  Min: 96 %  Max: 98 %  24hr I & O:      Intake/Output Summary (Last 24 hours) at 2021 0957  Last data filed at 2021 0826  Gross per 24 hour   Intake 740 ml   Output --   Net 740 ml       CONSTITUTIONALl:FAIRLY  WELL DEVELOPED,ORIENTED,CO-OPERATIVE  HEENT:NORMAL. NECK:  supple, symmetrical, trachea midline,Carotids- normal,JVP- flat  HEMATOLOGIC/LYMPHATICS:  no cervical lymphadenopathy  LUNGS:clear  CARDIOVASCULAR:  Normal apical impulse, regular rate and rhythm, normal S1 and S2, no S3 or S4, and no murmur noted  ABDOMEN:  normal bowel sounds, non-distended, non-tender, no masses palpated  EXTREMITIES: ARTHRITIC CHANGES. MOVEMENTS-LIMITED. PEDAL PULSES-FAIR.   SKIN:  normal skin color, texture, turgor    Data    CBC:   Lab Results   Component Value Date    WBC 7.0 2021    RBC 4.21 2021    HGB 12.8 2021    HCT 39.2 2021    MCV 93.1 2021    MCH 30.4 2021    MCHC 32.7 2021    RDW 13.3 2021     06/12/2021    MPV 11.1 06/12/2021         ASSESSMENT:    Active Hospital Problems    Diagnosis Date Noted    Obesity (BMI 30.0-34. 9) [E66.9] 11/12/2013     Priority: High     Class: Chronic    Primary hypercholesterolemia [E78.00] 06/13/2013     Priority: High     Class: Chronic    Pulmonary embolism ([facility] [address] [I26.99] 06/10/2021    Mild chronic obstructive pulmonary disease ([facility] [address] [J44.9] 06/10/2021       PLAN: CONTINUE CURRENT MEDICATIONS AND Rx.ENCOURAGE AMBULATION. DISCUSS PLANNING WITH PULMONOLOGIST.       Electronically signed by Denia Milian MD on 6/12/21 at 9:57 AM EDT

## 2021-06-13 VITALS
DIASTOLIC BLOOD PRESSURE: 74 MMHG | RESPIRATION RATE: 18 BRPM | OXYGEN SATURATION: 97 % | HEART RATE: 88 BPM | BODY MASS INDEX: 32.15 KG/M2 | TEMPERATURE: 97.6 F | SYSTOLIC BLOOD PRESSURE: 110 MMHG | WEIGHT: 193 LBS | HEIGHT: 65 IN

## 2021-06-13 PROCEDURE — 94640 AIRWAY INHALATION TREATMENT: CPT

## 2021-06-13 PROCEDURE — G0378 HOSPITAL OBSERVATION PER HR: HCPCS

## 2021-06-13 PROCEDURE — 6370000000 HC RX 637 (ALT 250 FOR IP): Performed by: FAMILY MEDICINE

## 2021-06-13 PROCEDURE — 99239 HOSP IP/OBS DSCHRG MGMT >30: CPT | Performed by: FAMILY MEDICINE

## 2021-06-13 RX ADMIN — APIXABAN 10 MG: 5 TABLET, FILM COATED ORAL at 09:24

## 2021-06-13 RX ADMIN — IPRATROPIUM BROMIDE AND ALBUTEROL SULFATE 1 AMPULE: .5; 3 SOLUTION RESPIRATORY (INHALATION) at 06:04

## 2021-06-13 RX ADMIN — IPRATROPIUM BROMIDE AND ALBUTEROL SULFATE 1 AMPULE: .5; 3 SOLUTION RESPIRATORY (INHALATION) at 09:43

## 2021-06-13 ASSESSMENT — PAIN SCALES - GENERAL
PAINLEVEL_OUTOF10: 0
PAINLEVEL_OUTOF10: 0

## 2021-06-13 NOTE — PLAN OF CARE
Problem: Falls - Risk of:  Goal: Will remain free from falls  Description: Will remain free from falls  6/13/2021 1127 by Lyubov Jacob RN  Outcome: Completed  6/13/2021 0334 by Vandana Arellano RN  Outcome: Met This Shift  Goal: Absence of physical injury  Description: Absence of physical injury  6/13/2021 1127 by Lyubov Jacob RN  Outcome: Completed  6/13/2021 0334 by Vandana Arellano RN  Outcome: Met This Shift     Problem: Breathing Pattern - Ineffective:  Goal: Ability to achieve and maintain a regular respiratory rate will improve  Description: Ability to achieve and maintain a regular respiratory rate will improve  6/13/2021 1127 by Lyubov Jacob RN  Outcome: Completed  6/13/2021 0334 by Vandana Arellano RN  Outcome: Met This Shift     Problem: Skin Integrity:  Goal: Will show no infection signs and symptoms  Description: Will show no infection signs and symptoms  6/13/2021 1127 by Lyubov Jacob RN  Outcome: Completed  6/13/2021 0334 by Vandana Arellano RN  Outcome: Met This Shift  Goal: Absence of new skin breakdown  Description: Absence of new skin breakdown  6/13/2021 1127 by Lyubov Jacob RN  Outcome: Completed  6/13/2021 0334 by Vandana Arellano RN  Outcome: Met This Shift

## 2021-06-13 NOTE — PROGRESS NOTES
Pt discharged, IV taken out of left AC. With pressure dressing applied. Pt very happy that she is going home. Vitals stable and pulse ox check 95%. Pt stated that she feels way better. Discharge instructions reviewed with pt with verbal understanding. Pt leaving via wheelchair to her own transportation.

## 2021-06-13 NOTE — PLAN OF CARE
Problem: Falls - Risk of:  Goal: Will remain free from falls  Description: Will remain free from falls  6/13/2021 1127 by Eddie Jones RN  Outcome: Completed  6/13/2021 0334 by Mainor Kelly RN  Outcome: Met This Shift  Goal: Absence of physical injury  Description: Absence of physical injury  6/13/2021 1127 by Eddie Jones RN  Outcome: Completed  6/13/2021 0334 by Mainor Kelly RN  Outcome: Met This Shift     Problem: Breathing Pattern - Ineffective:  Goal: Ability to achieve and maintain a regular respiratory rate will improve  Description: Ability to achieve and maintain a regular respiratory rate will improve  6/13/2021 1127 by Eddie Jones RN  Outcome: Completed  6/13/2021 0334 by Mainor Kelly RN  Outcome: Met This Shift     Problem: Skin Integrity:  Goal: Will show no infection signs and symptoms  Description: Will show no infection signs and symptoms  6/13/2021 1127 by Eddie Jones RN  Outcome: Completed  6/13/2021 0334 by Mainor Kelly RN  Outcome: Met This Shift  Goal: Absence of new skin breakdown  Description: Absence of new skin breakdown  6/13/2021 1127 by Eddie Jones RN  Outcome: Completed  6/13/2021 0334 by Mainor Kelly RN  Outcome: Met This Shift

## 2021-06-13 NOTE — DISCHARGE SUMMARY
Discharge Summary    Tamara Young  :  1960  MRN:  [id number]    Admit date:  6/10/2021  Discharge date:  2021    Admitting Physician:  Beulah Terry MD    Admission Diagnoses: Pulmonary embolism, unspecified chronicity, unspecified pulmonary embolism type, unspecified whether acute cor pulmonale present [facility]) [I26.99]    Discharge Diagnoses: Active Hospital Problems    Diagnosis Date Noted    Obesity (BMI 30.0-34. 9) [E66.9] 2013     Priority: High     Class: Chronic    Primary hypercholesterolemia [E78.00] 2013     Priority: High     Class: Chronic    Pulmonary embolism ([facility] [address] [I26.99] 06/10/2021    Mild chronic obstructive pulmonary disease ([facility] [address] [J44.9] 06/10/2021       Consults:  pulmonary/intensive care    HPI/Hospital Course:   BREATHING TREATMENTS, STARTED ON ELIQUIS, ENCOURAGED AMBULATION.     Discharge Medications:        Medication List      START taking these medications    apixaban 5 MG Tabs tablet  Commonly known as: ELIQUIS  Take 2 tablets by mouth 2 times daily For 1 week and then take 1 tablet 2 times a day        CONTINUE taking these medications    albuterol sulfate  (90 Base) MCG/ACT inhaler  Inhale 1 puff into the lungs every 4 hours as needed for Wheezing     aspirin EC 81 MG EC tablet     folic acid 1 MG tablet  Commonly known as: FOLVITE  Take 1 tablet by mouth daily     omeprazole 20 MG delayed release capsule  Commonly known as: PRILOSEC     therapeutic multivitamin-minerals tablet           Where to Get Your Medications      These medications were sent to [address] [place] [address] [phone]  [address] [place] [address], [address] [address]    Phone: [phone]   · apixaban 5 MG Tabs tablet         Disposition:   home    CONDITION AT DISCHARGE: IMPROVED    Patient Instructions:   Current Discharge Medication List      START taking these medications    Details   apixaban (ELIQUIS) 5 MG TABS tablet Take 2 tablets by mouth 2 times daily For 1 week and then take 1 tablet 2 times a day  Qty: 60 tablet, Refills: 3         CONTINUE these medications which have NOT CHANGED    Details   Multiple Vitamins-Minerals (THERAPEUTIC MULTIVITAMIN-MINERALS) tablet Take 1 tablet by mouth daily      omeprazole (PRILOSEC) 20 MG delayed release capsule Take 20 mg by mouth daily      folic acid (FOLVITE) 1 MG tablet Take 1 tablet by mouth daily  Qty: 90 tablet, Refills: 3    Associated Diagnoses: Pulmonary embolism, bilateral ([address]  Deep vein thrombosis (DVT) of popliteal vein of right lower extremity, unspecified chronicity (HCC)      aspirin EC 81 MG EC tablet Take 81 mg by mouth daily Last dose 5/29      albuterol sulfate  (90 Base) MCG/ACT inhaler Inhale 1 puff into the lungs every 4 hours as needed for Wheezing  Qty: 1 Inhaler, Refills: 3           Activity: activity as tolerated  Diet: regular diet    Follow-up with Mayela Zaldivar MD in 1 WEEK      Note that over 30 minutes was spent in preparing discharge papers, discussing discharge with patient, medication review, etc.      Signed:  Mayela Zaldivar MD  6/13/2021, 11:12 AM

## 2021-06-13 NOTE — PLAN OF CARE
Problem: Falls - Risk of:  Goal: Will remain free from falls  6/13/2021 0334 by Benjamin Bernstein RN  Outcome: Met This Shift     Problem: Falls - Risk of:  Goal: Absence of physical injury  6/13/2021 0334 by Benjamin Bernstein RN  Outcome: Met This Shift     Problem: Breathing Pattern - Ineffective:  Goal: Ability to achieve and maintain a regular respiratory rate will improve  6/13/2021 0334 by Benjamin Bernstein RN  Outcome: Met This Shift     Problem: Skin Integrity:  Goal: Will show no infection signs and symptoms  Outcome: Met This Shift     Problem: Skin Integrity:  Goal: Absence of new skin breakdown  Outcome: Met This Shift

## 2021-06-14 ENCOUNTER — CARE COORDINATION (OUTPATIENT)
Dept: OTHER | Facility: CLINIC | Age: 61
End: 2021-06-14

## 2021-06-14 NOTE — CARE COORDINATION
[facility] Transitions Initial Follow Up Call    Call within 2 business days of discharge: Yes    Patient: Rashmi Bales Patient : 1960   MRN: [id number]  Reason for Admission: multiple subsegmental pulmonary emboli  Discharge Date: 21 RARS: Readmission Risk Score: 8      Last Discharge [facility]       Complaint Diagnosis Description Type Department Provider    6/10/21 Shortness of Breath Multiple subsegmental pulmonary emboli without acute cor pulmonale [facility]) ED to Hosp-Admission (Discharged) (ADMITTED) David Cooley MD; Tatyana Heath. .. ACM attempted to reach patient for introduction to Associate Care Management related to Care Transitions. HIPAA compliant message left requesting a return phone call. Will attempt to outreach patient again.        Follow Up  Future Appointments   Date Time Provider Mayo Rivera   2021  3:15 PM Satya Crandall MD [facility] Radha Christine RN

## 2021-06-15 ENCOUNTER — OFFICE VISIT (OUTPATIENT)
Dept: FAMILY MEDICINE CLINIC | Age: 61
End: 2021-06-15
Payer: COMMERCIAL

## 2021-06-15 ENCOUNTER — CARE COORDINATION (OUTPATIENT)
Dept: OTHER | Facility: CLINIC | Age: 61
End: 2021-06-15

## 2021-06-15 VITALS
WEIGHT: 185 LBS | DIASTOLIC BLOOD PRESSURE: 82 MMHG | RESPIRATION RATE: 16 BRPM | SYSTOLIC BLOOD PRESSURE: 122 MMHG | BODY MASS INDEX: 30.82 KG/M2 | OXYGEN SATURATION: 98 % | HEART RATE: 91 BPM | TEMPERATURE: 97 F | HEIGHT: 65 IN

## 2021-06-15 DIAGNOSIS — E78.00 PRIMARY HYPERCHOLESTEROLEMIA: ICD-10-CM

## 2021-06-15 DIAGNOSIS — I26.92 ACUTE SADDLE PULMONARY EMBOLISM, UNSPECIFIED WHETHER ACUTE COR PULMONALE PRESENT (HCC): Primary | ICD-10-CM

## 2021-06-15 DIAGNOSIS — J44.9 MILD CHRONIC OBSTRUCTIVE PULMONARY DISEASE (HCC): ICD-10-CM

## 2021-06-15 DIAGNOSIS — E66.9 OBESITY (BMI 30.0-34.9): ICD-10-CM

## 2021-06-15 PROCEDURE — 1111F DSCHRG MED/CURRENT MED MERGE: CPT | Performed by: FAMILY MEDICINE

## 2021-06-15 PROCEDURE — 99495 TRANSJ CARE MGMT MOD F2F 14D: CPT | Performed by: FAMILY MEDICINE

## 2021-06-15 NOTE — CARE COORDINATION
prescriptions and are they filled?: Yes  Have you been contacted by a [address]?: No  Have you scheduled your follow up appointment?: Yes  How are you going to get to your appointment?: Car - family or friend to transport  Were you discharged with any Home Care or Post Acute Services: No  Do you feel like you have everything you need to keep you well at home?: Yes  Care Transitions Interventions         Follow Up  Future Appointments   Date Time Provider Mayo Rivera   6/15/2021  4:00 PM Matthew Mina MD [address] [place] [address],

## 2021-06-15 NOTE — PATIENT INSTRUCTIONS
LOW SALT,LOW CARB. AND LOW FAT DIET. CONTINUE CURRENT MEDICATIONS TAKING REGULARLY. REGULAR WALKING ADVISED. ADVISED WEIGHT REDUCTION. NEXT APPOINTMENT IN 1 MONTH.

## 2021-06-15 NOTE — PROGRESS NOTES
Post-Discharge Transitional Care Management Services or Hospital Follow Up      [facility]   YOB: 1960    Date of Office Visit:  6/15/2021  Date of Hospital Admission: 6/10/21  Date of Hospital Discharge: 6/13/21  Risk of hospital readmission (high >=14%. Medium >=10%) :Readmission Risk Score: 8      Care management risk score Rising risk (score 2-5) and Complex Care (Scores >=6): 9     Non face to face  following discharge, date last encounter closed (first attempt may have been earlier): 6/15/2021 11:31 AM    Call initiated 2 business days of discharge: Yes    Patient Active Problem List   Diagnosis    Primary hypercholesterolemia    Lumbar strain    Obesity (BMI 30.0-34. 9)    Allergic rhinitis due to allergen    Mild chronic obstructive pulmonary disease (HCC)    H/O: gout    Pulmonary embolism (HCC)       Allergies   Allergen Reactions    Amoxicillin Itching       Medications listed as ordered at the time of discharge from hospital   Viviana Santana   Home Medication Instructions YOVANA:    Printed on:06/15/21 0762   Medication Information                      albuterol sulfate  (90 Base) MCG/ACT inhaler  Inhale 1 puff into the lungs every 4 hours as needed for Wheezing             apixaban (ELIQUIS) 5 MG TABS tablet  Take 2 tablets by mouth 2 times daily For 1 week and then take 1 tablet 2 times a day.              aspirin EC 81 MG EC tablet  Take 81 mg by mouth daily Last dose 5/66             folic acid (FOLVITE) 1 MG tablet  Take 1 tablet by mouth daily             Multiple Vitamins-Minerals (THERAPEUTIC MULTIVITAMIN-MINERALS) tablet  Take 1 tablet by mouth daily             omeprazole (PRILOSEC) 20 MG delayed release capsule  Take 20 mg by mouth daily                   Medications marked \"taking\" at this time  Outpatient Medications Marked as Taking for the 6/15/21 encounter (Office Visit) with Edwina Rubio MD   Medication Sig Dispense Refill    apixaban (ELIQUIS) 5 MG TABS light, extraocular eye movements intact, conjunctivae normal  ENT: tympanic membrane, external ear and ear canal normal bilaterally, nose without deformity, nasal mucosa and turbinates normal without polyps  Neck: supple and non-tender without mass, no thyromegaly or thyroid nodules, no cervical lymphadenopathy  Pulmonary/Chest: clear to auscultation bilaterally- no wheezes, rales or rhonchi, normal air movement, no respiratory distress  Cardiovascular: normal rate, regular rhythm, normal S1 and S2, no murmurs, rubs, clicks, or gallops, distal pulses intact, no carotid bruits  Abdomen: soft, non-tender, non-distended, normal bowel sounds, no masses or organomegaly  Extremities: no cyanosis, clubbing or edema  Musculoskeletal: normal range of motion, no joint swelling, deformity or tenderness  Neurologic: reflexes normal and symmetric, no cranial nerve deficit, gait, coordination and speech normal    Assessment/Plan:  1. Acute saddle pulmonary embolism, unspecified whether acute cor pulmonale present (HCC)  IMPROVING    2. Mild chronic obstructive pulmonary disease (HCC)  STABLE    3. Primary hypercholesterolemia  CONTROLLED    4. Obesity (BMI 30.0-34. 9)  STABLE        Medical Decision Making: moderate complexity

## 2021-06-16 ENCOUNTER — TELEPHONE (OUTPATIENT)
Dept: FAMILY MEDICINE CLINIC | Age: 61
End: 2021-06-16

## 2021-06-16 DIAGNOSIS — I26.92 ACUTE SADDLE PULMONARY EMBOLISM, UNSPECIFIED WHETHER ACUTE COR PULMONALE PRESENT (HCC): ICD-10-CM

## 2021-06-16 DIAGNOSIS — J44.9 MILD CHRONIC OBSTRUCTIVE PULMONARY DISEASE (HCC): Primary | ICD-10-CM

## 2021-06-16 NOTE — TELEPHONE ENCOUNTER
Patient left message on my line about wanting to see a different provider. VM was left to advise on which provider she wanted to see.

## 2021-06-16 NOTE — TELEPHONE ENCOUNTER
Patient called she does need referral to  acute saddle PE and COPD      Last seen 6/15/2021  Next appt Visit date not found

## 2021-06-23 ENCOUNTER — CARE COORDINATION (OUTPATIENT)
Dept: OTHER | Facility: CLINIC | Age: 61
End: 2021-06-23

## 2021-06-23 NOTE — CARE COORDINATION
[facility] Transitions Follow Up Call    2021    Patient: Philip Prudent  Patient : 1960   MRN: [id number]  Reason for Admission: multiple subsegmental PE  Discharge Date: 21 RARS: Readmission Risk Score: 8    ACM attempted to reach patient for follow up call. HIPAA compliant message left requesting a return phone call at patients convenience. Will continue to follow. Follow Up  No future appointments.     Cristobal Waite RN

## 2021-07-08 ENCOUNTER — CARE COORDINATION (OUTPATIENT)
Dept: OTHER | Facility: CLINIC | Age: 61
End: 2021-07-08

## 2021-07-08 NOTE — CARE COORDINATION
[address] [facility] Transitions Follow Up Call    2021    Patient: Colin Tarango  Patient : 1960   MRN: [id number]  Reason for Admission: multiple subsegmental PE  Discharge Date: 21 RARS: Readmission Risk Score: 8       ACM attempted to reach patient for follow up call. This is the second attempt. HIPAA compliant message left requesting a return phone call at patients convenience. Will continue to follow. Lost to follow up letter sent via Weblio. Follow Up  No future appointments.     Samia Meyer RN

## 2021-07-14 ENCOUNTER — CARE COORDINATION (OUTPATIENT)
Dept: OTHER | Facility: CLINIC | Age: 61
End: 2021-07-14

## 2021-07-14 NOTE — CARE COORDINATION
[address] Transitions Follow Up Call    2021    Patient: Maico Foy  Patient : 1960   MRN: [id number]  Reason for Admission: multiple subsegmental PE  Discharge Date: 21 RARS: Readmission Risk Score: 8       ACM attempted to reach patient for follow up call. HIPAA compliant message left requesting a return phone call at patients convenience. This is the final attempt. Final UTR letter sent via FedB[employer].     Follow Up  Future Appointments   Date Time Provider Mayo Rivera   2021 11:00 AM [address] [place] [address] [address]   2021 11:00 AM Rosalba Meng MD Blood Cancer [facility]       Conchis Valle RN

## 2021-08-09 ENCOUNTER — HOSPITAL ENCOUNTER (OUTPATIENT)
Age: 61
Discharge: HOME | End: 2021-08-09
Payer: COMMERCIAL

## 2021-08-09 ENCOUNTER — HOSPITAL ENCOUNTER (OUTPATIENT)
Dept: CT IMAGING | Age: 61
Discharge: HOME | End: 2021-08-09
Payer: COMMERCIAL

## 2021-08-09 DIAGNOSIS — R59.1 LYMPHADENOPATHY: ICD-10-CM

## 2021-08-09 LAB
BUN BLDV-MCNC: 15 MG/DL (ref 6–23)
CREAT SERPL-MCNC: 0.8 MG/DL (ref 0.5–1)
GFR AFRICAN AMERICAN: >60
GFR NON-AFRICAN AMERICAN: >60 ML/MIN/1.73

## 2021-08-09 PROCEDURE — 82565 ASSAY OF CREATININE: CPT

## 2021-08-09 PROCEDURE — 6360000004 HC RX CONTRAST MEDICATION: Performed by: RADIOLOGY

## 2021-08-09 PROCEDURE — 84520 ASSAY OF UREA NITROGEN: CPT

## 2021-08-09 PROCEDURE — 71260 CT THORAX DX C+: CPT

## 2021-08-09 PROCEDURE — 36415 COLL VENOUS BLD VENIPUNCTURE: CPT

## 2021-08-09 RX ADMIN — IOPAMIDOL 75 ML: 755 INJECTION, SOLUTION INTRAVENOUS at 14:26

## 2021-08-19 ENCOUNTER — HOSPITAL ENCOUNTER (OUTPATIENT)
Dept: INFUSION THERAPY | Age: 61
Discharge: HOME | End: 2021-08-19
Payer: COMMERCIAL

## 2021-08-19 ENCOUNTER — TELEPHONE (OUTPATIENT)
Dept: FAMILY MEDICINE CLINIC | Age: 61
End: 2021-08-19

## 2021-08-19 ENCOUNTER — OFFICE VISIT (OUTPATIENT)
Dept: ONCOLOGY | Age: 61
End: 2021-08-19
Payer: COMMERCIAL

## 2021-08-19 VITALS
HEIGHT: 65 IN | HEART RATE: 82 BPM | DIASTOLIC BLOOD PRESSURE: 90 MMHG | BODY MASS INDEX: 31.27 KG/M2 | TEMPERATURE: 98.6 F | OXYGEN SATURATION: 99 % | SYSTOLIC BLOOD PRESSURE: 149 MMHG | RESPIRATION RATE: 18 BRPM | WEIGHT: 187.7 LBS

## 2021-08-19 DIAGNOSIS — I27.82 OTHER CHRONIC PULMONARY EMBOLISM WITHOUT ACUTE COR PULMONALE (HCC): Primary | ICD-10-CM

## 2021-08-19 DIAGNOSIS — I26.99 PULMONARY EMBOLISM, BILATERAL (HCC): Primary | ICD-10-CM

## 2021-08-19 DIAGNOSIS — I27.82 OTHER CHRONIC PULMONARY EMBOLISM WITHOUT ACUTE COR PULMONALE (HCC): ICD-10-CM

## 2021-08-19 LAB
ALBUMIN SERPL-MCNC: 4.1 G/DL (ref 3.5–5.2)
ALP BLD-CCNC: 105 U/L (ref 35–104)
ALT SERPL-CCNC: 17 U/L (ref 0–32)
ANION GAP SERPL CALCULATED.3IONS-SCNC: 8 MMOL/L (ref 7–16)
AST SERPL-CCNC: 17 U/L (ref 0–31)
BASOPHILS ABSOLUTE: 0.05 E9/L (ref 0–0.2)
BASOPHILS RELATIVE PERCENT: 0.9 % (ref 0–2)
BILIRUB SERPL-MCNC: 0.3 MG/DL (ref 0–1.2)
BUN BLDV-MCNC: 14 MG/DL (ref 6–23)
CALCIUM SERPL-MCNC: 9.3 MG/DL (ref 8.6–10.2)
CHLORIDE BLD-SCNC: 109 MMOL/L (ref 98–107)
CO2: 26 MMOL/L (ref 22–29)
CREAT SERPL-MCNC: 0.8 MG/DL (ref 0.5–1)
EOSINOPHILS ABSOLUTE: 0.61 E9/L (ref 0.05–0.5)
EOSINOPHILS RELATIVE PERCENT: 10.6 % (ref 0–6)
GFR AFRICAN AMERICAN: >60
GFR NON-AFRICAN AMERICAN: >60 ML/MIN/1.73
GLUCOSE BLD-MCNC: 98 MG/DL (ref 74–99)
HCT VFR BLD CALC: 40.6 % (ref 34–48)
HEMOGLOBIN: 13.2 G/DL (ref 11.5–15.5)
IMMATURE GRANULOCYTES #: 0.01 E9/L
IMMATURE GRANULOCYTES %: 0.2 % (ref 0–5)
LYMPHOCYTES ABSOLUTE: 2.05 E9/L (ref 1.5–4)
LYMPHOCYTES RELATIVE PERCENT: 35.7 % (ref 20–42)
MCH RBC QN AUTO: 30.1 PG (ref 26–35)
MCHC RBC AUTO-ENTMCNC: 32.5 % (ref 32–34.5)
MCV RBC AUTO: 92.5 FL (ref 80–99.9)
MONOCYTES ABSOLUTE: 0.44 E9/L (ref 0.1–0.95)
MONOCYTES RELATIVE PERCENT: 7.7 % (ref 2–12)
NEUTROPHILS ABSOLUTE: 2.59 E9/L (ref 1.8–7.3)
NEUTROPHILS RELATIVE PERCENT: 44.9 % (ref 43–80)
PDW BLD-RTO: 13.7 FL (ref 11.5–15)
PLATELET # BLD: 339 E9/L (ref 130–450)
PMV BLD AUTO: 10.3 FL (ref 7–12)
POTASSIUM SERPL-SCNC: 4.1 MMOL/L (ref 3.5–5)
RBC # BLD: 4.39 E12/L (ref 3.5–5.5)
SODIUM BLD-SCNC: 143 MMOL/L (ref 132–146)
TOTAL PROTEIN: 7.2 G/DL (ref 6.4–8.3)
WBC # BLD: 5.8 E9/L (ref 4.5–11.5)

## 2021-08-19 PROCEDURE — 36415 COLL VENOUS BLD VENIPUNCTURE: CPT

## 2021-08-19 PROCEDURE — 99212 OFFICE O/P EST SF 10 MIN: CPT

## 2021-08-19 PROCEDURE — 80053 COMPREHEN METABOLIC PANEL: CPT

## 2021-08-19 PROCEDURE — 85025 COMPLETE CBC W/AUTO DIFF WBC: CPT

## 2021-08-19 NOTE — PROGRESS NOTES
[address]  [address] [place] [address]   Hematology/Oncology  Consult      Patient Name: Yadi Calvert  YOB: 1960  PCP: Tenisha Scott MD   Referring Provider:      Reason for Consultation: No chief complaint on file. History of Present Illness:  59years old female referred by Dr. Asiya Tyler for history of recurrent PE. Patient had an unprovoked bilateral PE with right heart strain in 2018 along with right leg DVT. She went off of Eliquis last year. She presented in June 2021 with shortness of breath and was found to have multiple bilateral segmental and subsegmental pulmonary emboli. This was unprovoked as well. She was discharged on Eliquis and is doing well. She reports chronic shortness of breath on exertion and the role of the pulmonology for that. No recent weight loss, loss of appetite, new pain, enlarged lymph nodes, night sweats. Review of systems: Over 10 systems were reviewed and all were negative except as mentioned above.     Diagnostic Data:     Past Medical History:   Diagnosis Date    Deep vein thrombosis (DVT) of popliteal vein of right lower extremity ([address] 5/2/2018    Hx of blood clots     Hyperlipidemia     no medication    Nausea & vomiting     Pneumonia     PONV (postoperative nausea and vomiting)     Pulmonary embolism, bilateral ([address] 5/1/2018    SOB (shortness of breath)        Patient Active Problem List    Diagnosis Date Noted    Lumbar strain 11/12/2013    Obesity (BMI 30.0-34.9) 11/12/2013    Primary hypercholesterolemia 06/13/2013    Mild chronic obstructive pulmonary disease ([address] 06/10/2021    H/O: gout 06/10/2021    Pulmonary embolism ([address] 06/10/2021    Allergic rhinitis due to allergen 02/11/2020        Past Surgical History:   Procedure Laterality Date    COLONOSCOPY  5/16/13    COLONOSCOPY N/A 3/12/2020    COLONOSCOPY performed by Veronika Calvillo MD at [facility], COLON, DIAGNOSTIC      EYE SURGERY      LAPAROSCOPY      endometreosis    TOE SURGERY      4th toe right foot    TONSILLECTOMY      UPPER GASTROINTESTINAL ENDOSCOPY N/A 3/12/2020    EGD BIOPSY performed by Ronen Toscano MD at [address] [place] [address] N/A 6/3/2021    EGD ESOPHAGOGASTRODUODENOSCOPY (PT GOT A PATCH IN THE PAST TO HELP WITH ANESTHESIA) performed by Ronen Toscano MD at [facility] ENDOSCOPY       Family History  Family History   Problem Relation Age of Onset    Cancer Mother         colon    Heart Disease Father     High Blood Pressure Father     Cancer Father         prostate    Heart Disease Maternal Aunt     Heart Disease Maternal Uncle     Heart Disease Paternal Aunt     Heart Disease Paternal Uncle        Social History    TOBACCO:   reports that she has never smoked. She has never used smokeless tobacco.  ETOH:   reports no history of alcohol use. Home Medications  Prior to Admission medications    Medication Sig Start Date End Date Taking? Authorizing Provider   apixaban (ELIQUIS) 5 MG TABS tablet Take 2 tablets by mouth 2 times daily For 1 week and then take 1 tablet 2 times a day.  6/14/21  Yes Sole Dela Cruz MD   Multiple Vitamins-Minerals (THERAPEUTIC MULTIVITAMIN-MINERALS) tablet Take 1 tablet by mouth daily   Yes Historical Provider, MD   albuterol sulfate  (90 Base) MCG/ACT inhaler Inhale 1 puff into the lungs every 4 hours as needed for Wheezing 6/10/21  Yes Sole Dela Cruz MD   omeprazole (PRILOSEC) 20 MG delayed release capsule Take 20 mg by mouth daily   Yes Historical Provider, MD   folic acid (FOLVITE) 1 MG tablet Take 1 tablet by mouth daily 3/26/21 3/21/22 Yes Matthew Parmar APRN - CNP   aspirin EC 81 MG EC tablet Take 81 mg by mouth daily Last dose 5/29   Yes Historical Provider, MD       Allergies  Allergies   Allergen Reactions    Amoxicillin Itching       Review of Systems:      Objective  BP (!) 149/90   Pulse 82   Temp 98.6 °F (37 °C)   Resp 18 Ht 5' 5\" (1.651 m)   Wt 187 lb 11.2 oz (85.1 kg)   LMP 08/15/2010 (Exact Date)   SpO2 99%   BMI 31.23 kg/m²     Physical Performance Status    Physical Exam:  General: AAO to person, place, time, and purpose, appears stated age, cooperative, no acute distress, pleasant   Head and neck : PERRLA, EOMI . Sclera non icteric. Oropharynx : Clear  Neck: no JVD,  no adenopathy, no carotid bruit  LYMPHATICS : No lymphadenopathy  Lungs: Clear to auscultation   Heart: Regular rate and regular rhythm, no murmur, normal S1, S2  Abdomen: Soft, non-tender;no masses, no organomegaly  Extremities: No edema,no cyanosis, no clubbing. Skin:  No rash. Neurologic:Cranial nerves grossly intact. No focal motor or sensory deficits .     Recent Laboratory Data-   Lab Results   Component Value Date    WBC 5.8 08/19/2021    HGB 13.2 08/19/2021    HCT 40.6 08/19/2021    MCV 92.5 08/19/2021     08/19/2021    LYMPHOPCT 35.7 08/19/2021    RBC 4.39 08/19/2021    MCH 30.1 08/19/2021    MCHC 32.5 08/19/2021    RDW 13.7 08/19/2021    NEUTOPHILPCT 44.9 08/19/2021    MONOPCT 7.7 08/19/2021    BASOPCT 0.9 08/19/2021    NEUTROABS 2.59 08/19/2021    LYMPHSABS 2.05 08/19/2021    MONOSABS 0.44 08/19/2021    EOSABS 0.61 (H) 08/19/2021    BASOSABS 0.05 08/19/2021       Lab Results   Component Value Date     08/19/2021    K 4.1 08/19/2021     (H) 08/19/2021    CO2 26 08/19/2021    BUN 14 08/19/2021    CREATININE 0.8 08/19/2021    GLUCOSE 98 08/19/2021    CALCIUM 9.3 08/19/2021    PROT 7.2 08/19/2021    LABALBU 4.1 08/19/2021    BILITOT 0.3 08/19/2021    ALKPHOS 105 (H) 08/19/2021    AST 17 08/19/2021    ALT 17 08/19/2021    LABGLOM >60 08/19/2021    GFRAA >60 08/19/2021       No results found for: IRON, TIBC, FERRITIN        Radiology-    CT CHEST W CONTRAST    Result Date: 8/10/2021  EXAMINATION: CT OF THE CHEST WITH CONTRAST 8/9/2021 1:22 pm TECHNIQUE: CT of the chest was performed with the administration of intravenous contrast. Multiplanar reformatted images are provided for review. Dose modulation, iterative reconstruction, and/or weight based adjustment of the mA/kV was utilized to reduce the radiation dose to as low as reasonably achievable. COMPARISON: 06/10/2021 HISTORY: ORDERING SYSTEM PROVIDED HISTORY: Lymphadenopathy FINDINGS: Mediastinum:  The aortic arch has  bovine morphology. Cardiac size and configuration is normal.  The esophagus is normal.  The trachea appears normal.  The aortic arch has normal morphology and caliber, the origins of the great vessels are normal.  A small amount of fluid lies in the superior pericardial recess. There is no sign of a mediastinal mass, mediastinal lymph nodes are normal in size / morphology. Lungs/pleura: Lung aeration and architecture is normal.  The pleural surfaces are normal, there are no effusions or pneumothorax. The bronchial tree demonstrates normal caliber, mildly increased wall thickness, and normal arborization. There are multiple, bilateral pulmonary nodules, the largest nodule lies right middle lobe and measures 3 mm. All nodules appear unchanged. Upper Abdomen: The visualized upper abdominal viscera is normal. Soft Tissues/Bones: There is biconvex vertebral curvature. The sternum and chest wall appear unremarkable. The axilla are free of masses or lymphadenopathy. Thoracic vertebral morphology and alignment is normal.  The axilla are free of masses or lymphadenopathy. 1. Multiple small unchanged right middle lobe pulmonary nodules. 2. Mild chronic airway disease, asthma versus bronchitis. 3. No signs of lymphadenopathy. ASSESSMENT/PLAN :    There are no diagnoses linked to this encounter. 59years old female with recurrent DVT PE. She had an unprovoked bilateral PE with heart strain in 2018 and had been on Eliquis since then. Stopped Eliquis last year. Had a recurrent bilateral segmental subsegmental PE in June 2021.   These were all unprovoked VTE's.  I would recommend indefinite anticoagulation. Hypercoagulable work-up in 2018 showed negative lupus anticoagulant, no factor V Leiden no prothrombin gene mutation. No further work up required. Recent scans show subcentimeter lung nodules which have been stable. CT chest from June also showed a nonspecific 1.8 cm pretracheal lymph node which was not noted on follow-up CT scan from August 2021. She follows with pulmonology for that. Return to clinic as needed. Return if symptoms worsen or fail to improve.      Pancho Maddox MD   Electronically signed 8/19/2021 at 12:10 PM

## 2021-08-19 NOTE — TELEPHONE ENCOUNTER
----- Message from [facility] sent at 8/19/2021  4:26 PM EDT -----  Subject: Message to Provider    QUESTIONS  Information for Provider? Patient is requesting bone density test and   Vitamin D level test.  ---------------------------------------------------------------------------  --------------  CALL BACK INFO  What is the best way for the office to contact you? OK to leave message on   voicemail  Preferred Call Back Phone Number? [phone]  ---------------------------------------------------------------------------  --------------  SCRIPT ANSWERS  Relationship to Patient?  Self

## 2021-08-20 NOTE — TELEPHONE ENCOUNTER
MISSED LAST APPOINTMENT IN June. CALL PATIENT FOR APPOINTMENT NEXT WEEK. DISCUSS NEXT VISIT AND ORDER.

## 2021-09-02 ENCOUNTER — OFFICE VISIT (OUTPATIENT)
Dept: FAMILY MEDICINE CLINIC | Age: 61
End: 2021-09-02
Payer: COMMERCIAL

## 2021-09-02 VITALS
DIASTOLIC BLOOD PRESSURE: 72 MMHG | TEMPERATURE: 97 F | HEIGHT: 65 IN | WEIGHT: 189 LBS | OXYGEN SATURATION: 98 % | BODY MASS INDEX: 31.49 KG/M2 | HEART RATE: 73 BPM | SYSTOLIC BLOOD PRESSURE: 132 MMHG

## 2021-09-02 DIAGNOSIS — E78.00 HYPERCHOLESTEREMIA: Primary | ICD-10-CM

## 2021-09-02 DIAGNOSIS — Z11.59 NEED FOR HEPATITIS C SCREENING TEST: ICD-10-CM

## 2021-09-02 DIAGNOSIS — Z83.49 FAMILY HISTORY OF HYPOTHYROIDISM: ICD-10-CM

## 2021-09-02 DIAGNOSIS — E66.9 OBESITY (BMI 30.0-34.9): ICD-10-CM

## 2021-09-02 DIAGNOSIS — I26.92 SADDLE EMBOLUS OF PULMONARY ARTERY WITHOUT ACUTE COR PULMONALE, UNSPECIFIED CHRONICITY (HCC): ICD-10-CM

## 2021-09-02 DIAGNOSIS — E55.9 HYPOVITAMINOSIS D: ICD-10-CM

## 2021-09-02 DIAGNOSIS — Z11.4 ENCOUNTER FOR SCREENING FOR HIV: ICD-10-CM

## 2021-09-02 PROCEDURE — 99214 OFFICE O/P EST MOD 30 MIN: CPT | Performed by: FAMILY MEDICINE

## 2021-09-02 RX ORDER — ERGOCALCIFEROL 1.25 MG/1
50000 CAPSULE ORAL WEEKLY
Qty: 12 CAPSULE | Refills: 1 | Status: SHIPPED
Start: 2021-09-02 | End: 2021-09-02 | Stop reason: SDUPTHER

## 2021-09-02 RX ORDER — ERGOCALCIFEROL 1.25 MG/1
50000 CAPSULE ORAL WEEKLY
Qty: 12 CAPSULE | Refills: 1 | Status: SHIPPED
Start: 2021-09-02 | End: 2022-02-08 | Stop reason: SDUPTHER

## 2021-09-02 NOTE — PROGRESS NOTES
OFFICE PROGRESS NOTE      SUBJECTIVE:        Patient ID:   Livia Benito is a 59 y.o. female who presents for   Chief Complaint   Patient presents with    Follow-up     dexa results           HPI:     FEELS GOOD. SEEING DR. Austyn Acevedo FOR BREATHING PROBLEM AND HEMATOLOGIST FOR BLOOD THINNER CONCERN. ON ELIQUIS FOR PAST 3 YEARS. HER FACTOR V IS NORMAL. HAS STRONG FH OF THYROID PROBLEM. WANTS TO BE CHECKED OUT FOR THE PROBLEM. WATCHING DIET GOOD. WALKING SOME FOR EXERCISE. TAKING MEDICATIONS REGULARLY. WILL CHECK WITH INSURANCE FOR DEXA COVERAGE. Prior to Admission medications    Medication Sig Start Date End Date Taking? Authorizing Provider   vitamin D (ERGOCALCIFEROL) 1.25 MG (95407 UT) CAPS capsule Take 1 capsule by mouth once a week 9/2/21  Yes Ken Mancilla MD   apixaban (ELIQUIS) 5 MG TABS tablet Take 2 tablets by mouth 2 times daily For 1 week and then take 1 tablet 2 times a day.  6/14/21  Yes Ken Mancilla MD   Multiple Vitamins-Minerals (THERAPEUTIC MULTIVITAMIN-MINERALS) tablet Take 1 tablet by mouth daily   Yes Historical Provider, MD   albuterol sulfate  (90 Base) MCG/ACT inhaler Inhale 1 puff into the lungs every 4 hours as needed for Wheezing 6/10/21  Yes Ken Mancilla MD     Social History     Socioeconomic History    Marital status: Single     Spouse name: None    Number of children: None    Years of education: None    Highest education level: None   Occupational History    None   Tobacco Use    Smoking status: Never Smoker    Smokeless tobacco: Never Used   Vaping Use    Vaping Use: Never used   Substance and Sexual Activity    Alcohol use: No    Drug use: No    Sexual activity: None   Other Topics Concern    None   Social History Narrative    None     Social Determinants of Health     Financial Resource Strain: Low Risk     Difficulty of Paying Living Expenses: Not hard at all   Food Insecurity: No Food Insecurity    Worried About [facility] in the Last Year: Never true    Josephine of Food in the Last Year: Never true   Transportation Needs: No Transportation Needs    Lack of Transportation (Medical): No    Lack of Transportation (Non-Medical): No   Physical Activity:     Days of Exercise per Week:     Minutes of Exercise per Session:    Stress:     Feeling of Stress :    Social Connections:     Frequency of Communication with Friends and Family:     Frequency of Social Gatherings with Friends and Family:     Attends Gnosticist Services:     Active Member of Clubs or Organizations:     Attends Club or Organization Meetings:     Marital Status:    Intimate Partner Violence:     Fear of Current or Ex-Partner:     Emotionally Abused:     Physically Abused:     Sexually Abused:        I have reviewed Viviana's allergies, medications, problem list, medical, social and family history and have updated as needed in the electronic medical record  Review Of Systems:    Skin: no abnormal pigmentation, rash, scaling, itching, masses, hair or nail changes  Eyes: no blurring, diplopia, or eye pain  Ears/Nose/Throat: no hearing loss, tinnitus, vertigo, nosebleed, nasal congestion, rhinorrhea, sore throat  Respiratory: no cough, pleuritic chest pain, dyspnea, or wheezing  Cardiovascular: no chest pain, angina, dyspnea on exertion, orthopnea, PND, palpitations, or claudication  Gastrointestinal: no nausea, vomiting, heartburn, diarrhea, constipation, bloating,  abdominal pain, or blood per rectum. Appetite is good  Genitourinary: no urinary urgency, frequency, dysuria, nocturia, hesitancy, or incontinence  Musculoskeletal: joint pains off and on. Morning stiffness.  Ambulating well  Neurologic: no paralysis, paresis, paresthesia, seizures, tremors, or headaches  Hematologic/Lymphatic/Immunologic: no anemia, abnormal bleeding/bruising, fever, chills, night sweats, swollen glands, or unexplained weight loss  Endocrine: no heat unspecified chronicity ([facility]  I26.92    3. Obesity (BMI 30.0-34. 9)  E66.9    4. Family history of hypothyroidism  Z83.49 TSH without Reflex     T4   5. Hypovitaminosis D  E55.9 Vitamin D 25 Hydroxy   6. Need for hepatitis C screening test  Z11.59 HEPATITIS C ANTIBODY   7. Encounter for screening for HIV  Z11.4 HIV-1 AND HIV-2 ANTIBODIES       PLAN:           Patient Instructions   LOW CARBOHYDRATE FOR  WEIGHT CONTROL. LOW FAT DIET FOR CHOLESTEROL CONTROL. TAKE ELIQUIS AS RECOMMENDED. DISCUSS CONTINUED USE WITH PULMONOLOGIST AND HEMATOLOGIST. TAKE  VITAMIN D-3 82506 UNITS WEEKLY FOR IMPROVING DEFICIENCY. REGULAR WALKING ADVISED. ADVISED WEIGHT REDUCTION. FASTING FOR LAB WORK PRIOR TO NEXT VISIT. NEXT APPOINTMENT IN 2 MONTHS. Return in about 2 months (around 11/2/2021) for FOLLOW UP VISIT. I have reviewed my findings and recommendations with Irina Mancilla.     Electronically signed by Mac Cordon MD on 9/2/21 at 1:48 PM EDT

## 2021-09-02 NOTE — PATIENT INSTRUCTIONS
LOW CARBOHYDRATE FOR  WEIGHT CONTROL. LOW FAT DIET FOR CHOLESTEROL CONTROL. TAKE ELIQUIS AS RECOMMENDED. DISCUSS CONTINUED USE WITH PULMONOLOGIST AND HEMATOLOGIST. TAKE  VITAMIN D-3 20951 UNITS WEEKLY FOR IMPROVING DEFICIENCY. REGULAR WALKING ADVISED. ADVISED WEIGHT REDUCTION. FASTING FOR LAB WORK PRIOR TO NEXT VISIT. NEXT APPOINTMENT IN 2 MONTHS.

## 2021-09-08 ENCOUNTER — OFFICE VISIT (OUTPATIENT)
Dept: FAMILY MEDICINE CLINIC | Age: 61
End: 2021-09-08
Payer: COMMERCIAL

## 2021-09-08 VITALS
WEIGHT: 189.2 LBS | RESPIRATION RATE: 16 BRPM | DIASTOLIC BLOOD PRESSURE: 89 MMHG | SYSTOLIC BLOOD PRESSURE: 127 MMHG | BODY MASS INDEX: 31.52 KG/M2 | OXYGEN SATURATION: 100 % | TEMPERATURE: 97.1 F | HEIGHT: 65 IN | HEART RATE: 90 BPM

## 2021-09-08 DIAGNOSIS — L25.5 DERMATITIS DUE TO PLANTS, INCLUDING POISON IVY, SUMAC, AND OAK: Primary | ICD-10-CM

## 2021-09-08 PROCEDURE — 96372 THER/PROPH/DIAG INJ SC/IM: CPT | Performed by: NURSE PRACTITIONER

## 2021-09-08 PROCEDURE — 99213 OFFICE O/P EST LOW 20 MIN: CPT | Performed by: NURSE PRACTITIONER

## 2021-09-08 RX ORDER — DEXAMETHASONE SODIUM PHOSPHATE 10 MG/ML
10 INJECTION INTRAMUSCULAR; INTRAVENOUS ONCE
Status: COMPLETED | OUTPATIENT
Start: 2021-09-08 | End: 2021-09-08

## 2021-09-08 RX ORDER — METHYLPREDNISOLONE 4 MG/1
TABLET ORAL
Qty: 1 KIT | Refills: 0 | Status: SHIPPED | OUTPATIENT
Start: 2021-09-08 | End: 2021-09-14

## 2021-09-08 RX ORDER — KETOROLAC TROMETHAMINE 30 MG/ML
30 INJECTION, SOLUTION INTRAMUSCULAR; INTRAVENOUS ONCE
Status: COMPLETED | OUTPATIENT
Start: 2021-09-08 | End: 2021-09-08

## 2021-09-08 RX ADMIN — DEXAMETHASONE SODIUM PHOSPHATE 10 MG: 10 INJECTION INTRAMUSCULAR; INTRAVENOUS at 16:38

## 2021-09-08 RX ADMIN — KETOROLAC TROMETHAMINE 30 MG: 30 INJECTION, SOLUTION INTRAMUSCULAR; INTRAVENOUS at 16:37

## 2021-09-08 NOTE — PROGRESS NOTES
Chief Complaint:   Poison Lucero Ouch (pt said started Monday has it on hands, face, arms and legs including neck )    History of Present Illness   Source of history provided by:  patient. Eulogio Sylvester is a 59 y.o. old female who has a past medical history of:   Past Medical History:   Diagnosis Date    Deep vein thrombosis (DVT) of popliteal vein of right lower extremity ([address] 5/2/2018    Hx of blood clots     Hyperlipidemia     no medication    Nausea & vomiting     Pneumonia     PONV (postoperative nausea and vomiting)     Pulmonary embolism, bilateral ([address] 5/1/2018    SOB (shortness of breath)     presents to the [facility] care for complaint of redness to multiple body parts which began 3 days ago. The symptoms were triggered by gardening. Since onset the symptoms have progressd. Pt has had similar symptoms in the past.  Pt states the area is itchy, burns slightly, and has noted streaking. Pt exposed to poison ivy. Denies any fever, chills, HA, recent illness, myalgias, vomiting, or lethargy. Denies any changes in vision. Has tried several OTC without relief. ROS    Unless otherwise stated in this report or unable to obtain because of the patient's clinical or mental status as evidenced by the medical record, this patients's positive and negative responses for Review of Systems, constitutional, psych, eyes, ENT, cardiovascular, respiratory, gastrointestinal, neurological, genitourinary, musculoskeletal, integument systems and systems related to the presenting problem are either stated in the preceding or were not pertinent or were negative for the symptoms and/or complaints related to the medical problem. Past Surgical History:  has a past surgical history that includes Tonsillectomy; eye surgery; Toe Surgery; laparoscopy; Colonoscopy (5/16/13); Upper gastrointestinal endoscopy (N/A, 3/12/2020); Colonoscopy (N/A, 3/12/2020);  Upper gastrointestinal endoscopy (N/A, 6/3/2021); and Endoscopy, colon, diagnostic. Social History:  reports that she has never smoked. She has never used smokeless tobacco. She reports that she does not drink alcohol and does not use drugs. Family History: family history includes Cancer in her father and mother; Heart Disease in her father, maternal aunt, maternal uncle, paternal aunt, and paternal uncle; High Blood Pressure in her father. Allergies: Amoxicillin    Physical Exam         VS:  /89 (Site: Right Upper Arm, Position: Sitting, Cuff Size: Medium Adult)   Pulse 90   Temp 97.1 °F (36.2 °C)   Resp 16   Ht 5' 5\" (1.651 m)   Wt 189 lb 3.2 oz (85.8 kg)   LMP 08/15/2010 (Exact Date)   SpO2 100%   BMI 31.48 kg/m²    Oxygen Saturation Interpretation: Normal.    Constitutional:  Alert, development consistent with age. HEENT:  NC/NT. Airway patent. Eyes:  PERRL, EOMI, no discharge. Ears:  TMs without perforation, injection, or bulging. External canals clear without exudate. Mouth:  Mucous membranes moist without lesions, tongue and gums normal.  Throat:  Pharynx without injection, exudate, or tonsillar hypertrophy. Airway patient. Neck:  Supple. No lymphadenopathy. Lungs:  Clear to auscultation and breath sounds equal.  Heart:  Regular rate and rhythm. Skin:  Normal turgor and appropriately dry to touch. Diffuse areas with moderate erythematous areas forming a somewhat linear pattern. Small, fluid-filled bullae noted over the erythematous rash. No TTP over same area. No excoriations, macules, papules, or pustules noted. Mild drainage noted. No ymphangitic streaking. Neurological:  Orientation age-appropriate unless noted elseware. Motor functions intact. Assessment / Plan     Impression(s):  1. Dermatitis due to plants, including poison ivy, sumac, and oak      Disposition:  Disposition: Discharge to home    New Prescriptions    METHYLPREDNISOLONE (MEDROL DOSEPACK) 4 MG TABLET    Take by mouth.        Pt advised to f/u with PCP in 2-3 days for recheck and further management. ER if changes or worse. Pt advised to take all medications as directed.      Administrations This Visit     dexamethasone (DECADRON) injection 10 mg     Admin Date  09/08/2021 Action  Given Dose  10 mg Route  IntraMUSCular Administered By  Titus Mims MA          ketorolac (TORADOL) injection 30 mg     Admin Date  09/08/2021 Action  Given Dose  30 mg Route  IntraMUSCular Administered By  Alon Hood MA

## 2021-09-15 ENCOUNTER — HOSPITAL ENCOUNTER (OUTPATIENT)
Dept: PULMONOLOGY | Age: 61
Discharge: HOME | End: 2021-09-15
Payer: COMMERCIAL

## 2021-09-15 DIAGNOSIS — J45.40 MODERATE PERSISTENT ASTHMA, UNSPECIFIED WHETHER COMPLICATED: ICD-10-CM

## 2021-09-15 PROCEDURE — 94070 EVALUATION OF WHEEZING: CPT

## 2021-09-15 PROCEDURE — 94375 RESPIRATORY FLOW VOLUME LOOP: CPT

## 2021-09-15 PROCEDURE — 6360000002 HC RX W HCPCS: Performed by: INTERNAL MEDICINE

## 2021-09-15 RX ADMIN — METHACHOLINE CHLORIDE 100 MG: 100 POWDER, FOR SOLUTION RESPIRATORY (INHALATION) at 13:29

## 2021-09-19 NOTE — PROCEDURES
[address]                               PULMONARY FUNCTION    PATIENT NAME: Anabel Stout                      :        1960  MED REC NO:   [id number]                            ROOM:  ACCOUNT NO:   [de-identified]                           ADMIT DATE: 09/15/2021  PROVIDER:     Jesus Acuña MD    DATE OF PROCEDURE:  09/15/2021    METHACHOLINE CHALLENGE TEST    ATTENDING:  Andra Zhu MD    PULMONOLOGIST:  Jesus Acuña MD    In conclusion, this is a negative methacholine challenge test.  There  was no evidence of bronchoconstriction elicited by the methacholine. Correlation is needed.         Alexa Blanchard MD    D: 2021 14:35:08       T: 2021 22:33:07     [id number]  Job#: [id number]     Doc#: [id number]    CC:

## 2021-11-01 ENCOUNTER — TELEPHONE (OUTPATIENT)
Dept: FAMILY MEDICINE CLINIC | Age: 61
End: 2021-11-01

## 2021-11-01 NOTE — TELEPHONE ENCOUNTER
Patient called for refill.      Last seen 9/2/2021  Next appt 11/11/2021    [facility] states they have faxed you a request 10/29/21

## 2021-11-02 ENCOUNTER — TELEPHONE (OUTPATIENT)
Dept: FAMILY MEDICINE CLINIC | Age: 61
End: 2021-11-02

## 2021-11-02 NOTE — TELEPHONE ENCOUNTER
Pt. Called checking on Rx of eliquis Pt informed  Its pending. She sates she will call this afternoon, to see if it has been sent to the pharmacy.

## 2021-11-04 DIAGNOSIS — Z83.49 FAMILY HISTORY OF HYPOTHYROIDISM: ICD-10-CM

## 2021-11-04 DIAGNOSIS — Z11.59 NEED FOR HEPATITIS C SCREENING TEST: ICD-10-CM

## 2021-11-04 DIAGNOSIS — E55.9 HYPOVITAMINOSIS D: ICD-10-CM

## 2021-11-04 DIAGNOSIS — Z11.4 ENCOUNTER FOR SCREENING FOR HIV: ICD-10-CM

## 2021-11-04 LAB — VITAMIN D 25-HYDROXY: 24 NG/ML (ref 30–100)

## 2021-11-05 LAB
HEPATITIS C ANTIBODY INTERPRETATION: NORMAL
HIV-1 AND HIV-2 ANTIBODIES: NORMAL

## 2021-11-11 ENCOUNTER — OFFICE VISIT (OUTPATIENT)
Dept: FAMILY MEDICINE CLINIC | Age: 61
End: 2021-11-11
Payer: COMMERCIAL

## 2021-11-11 VITALS
SYSTOLIC BLOOD PRESSURE: 110 MMHG | BODY MASS INDEX: 33.32 KG/M2 | DIASTOLIC BLOOD PRESSURE: 80 MMHG | HEART RATE: 88 BPM | RESPIRATION RATE: 16 BRPM | WEIGHT: 200 LBS | OXYGEN SATURATION: 98 % | HEIGHT: 65 IN | TEMPERATURE: 98 F

## 2021-11-11 DIAGNOSIS — E55.9 HYPOVITAMINOSIS D: ICD-10-CM

## 2021-11-11 DIAGNOSIS — E78.00 PRIMARY HYPERCHOLESTEROLEMIA: Primary | ICD-10-CM

## 2021-11-11 DIAGNOSIS — E66.9 OBESITY (BMI 30.0-34.9): ICD-10-CM

## 2021-11-11 DIAGNOSIS — Z12.31 SCREENING MAMMOGRAM FOR BREAST CANCER: ICD-10-CM

## 2021-11-11 DIAGNOSIS — I26.92 SADDLE EMBOLUS OF PULMONARY ARTERY WITHOUT ACUTE COR PULMONALE, UNSPECIFIED CHRONICITY (HCC): ICD-10-CM

## 2021-11-11 PROCEDURE — 99213 OFFICE O/P EST LOW 20 MIN: CPT | Performed by: FAMILY MEDICINE

## 2021-11-11 NOTE — PATIENT INSTRUCTIONS
LOW CARBOHYDRATE FOR  WEIGHT CONTROL. LOW FAT DIET FOR CHOLESTEROL CONTROL. TAKE ELIQUIS AS RECOMMENDED. DISCUSS CONTINUED USE WITH PULMONOLOGIST AND HEMATOLOGIST. TAKE  VITAMIN D-3 [id number] UNITS WEEKLY FOR IMPROVING DEFICIENCY. REGULAR WALKING ADVISED. ADVISED WEIGHT REDUCTION. MAMMOGRAM AS SCHEDULED. FASTING FOR LAB WORK PRIOR TO NEXT VISIT. NEXT APPOINTMENT IN 3 MONTHS.

## 2021-11-11 NOTE — PROGRESS NOTES
OFFICE PROGRESS NOTE      SUBJECTIVE:        Patient ID:   Kamilah Mejia is a 59 y.o. female who presents for   Chief Complaint   Patient presents with    Hyperlipidemia    Health Maintenance     due for mammogram           HPI:     RECHECK CHOLESTEROL, VITAMIN D-3 AND PE.  FEELS GOOD. WATCHING DIET GOOD. WALKING SOME FOR EXERCISE. TAKING MEDICATIONS REGULARLY. Prior to Admission medications    Medication Sig Start Date End Date Taking? Authorizing Provider   apixaban (ELIQUIS) 5 MG TABS tablet Take 1 tablet by mouth 2 times daily 11/2/21 12/2/21  Renato Acevedo APRN - CNP   vitamin D (ERGOCALCIFEROL) 1.25 MG (42651 UT) CAPS capsule Take 1 capsule by mouth once a week 9/2/21   Rosalina Fiore MD   Multiple Vitamins-Minerals (THERAPEUTIC MULTIVITAMIN-MINERALS) tablet Take 1 tablet by mouth daily    Historical Provider, MD   albuterol sulfate  (90 Base) MCG/ACT inhaler Inhale 1 puff into the lungs every 4 hours as needed for Wheezing 6/10/21   Rosalina Fiore MD     Social History     Socioeconomic History    Marital status: Single     Spouse name: None    Number of children: None    Years of education: None    Highest education level: None   Occupational History    None   Tobacco Use    Smoking status: Never Smoker    Smokeless tobacco: Never Used   Vaping Use    Vaping Use: Never used   Substance and Sexual Activity    Alcohol use: No    Drug use: No    Sexual activity: None   Other Topics Concern    None   Social History Narrative    None     Social Determinants of Health     Financial Resource Strain: Low Risk     Difficulty of Paying Living Expenses: Not hard at all   Food Insecurity: No Food Insecurity    Worried About Running Out of Food in the Last Year: Never true    Josephine of Food in the Last Year: Never true   Transportation Needs: No Transportation Needs    Lack of Transportation (Medical):  No    Lack of Transportation (Non-Medical): No   Physical Activity:     Days of Exercise per Week: Not on file    Minutes of Exercise per Session: Not on file   Stress:     Feeling of Stress : Not on file   Social Connections:     Frequency of Communication with Friends and Family: Not on file    Frequency of Social Gatherings with Friends and Family: Not on file    Attends [employer] Services: Not on file    Active Member of [address] [place] [address] or Organizations: Not on file    Attends Club or Organization Meetings: Not on file    Marital Status: Not on file   Intimate Partner Violence:     Fear of Current or Ex-Partner: Not on file    Emotionally Abused: Not on file    Physically Abused: Not on file    Sexually Abused: Not on file   Housing Stability: [address] Unable to Pay for Housing in the Last Year: No    Number of [place] in the Last Year: 1    Unstable Housing in the Last Year: No       I have reviewed Leahs allergies, medications, problem list, medical, social and family history and have updated as needed in the electronic medical record  Review Of Systems:    Skin: no abnormal pigmentation, rash, scaling, itching, masses, hair or nail changes  Eyes: no blurring, diplopia, or eye pain  Ears/Nose/Throat: no hearing loss, tinnitus, vertigo, nosebleed, nasal congestion, rhinorrhea, sore throat  Respiratory: no cough, pleuritic chest pain, dyspnea, or wheezing  Cardiovascular: no chest pain, angina, dyspnea on exertion, orthopnea, PND, palpitations, or claudication  Gastrointestinal: no nausea, vomiting, heartburn, diarrhea, constipation, bloating,  abdominal pain, or blood per rectum. Appetite is good  Genitourinary: no urinary urgency, frequency, dysuria, nocturia, hesitancy, or incontinence  Musculoskeletal: joint pains off and on. Morning stiffness.  Ambulating well  Neurologic: no paralysis, paresis, paresthesia, seizures, tremors, or headaches  Hematologic/Lymphatic/Immunologic: no anemia, abnormal bleeding/bruising, fever, chills, night sweats, swollen glands, or unexplained weight loss  Endocrine: no heat or cold intolerance and no polyphagia, polydipsia, or polyuria        OBJECTIVE:     VS:  Wt Readings from Last 3 Encounters:   11/11/21 200 lb (90.7 kg)   09/08/21 189 lb 3.2 oz (85.8 kg)   09/02/21 189 lb (85.7 kg)     Temp Readings from Last 3 Encounters:   11/11/21 98 °F (36.7 °C)   09/08/21 97.1 °F (36.2 °C)   09/02/21 97 °F (36.1 °C)     BP Readings from Last 3 Encounters:   11/11/21 110/80   09/08/21 127/89   09/02/21 132/72        General appearance: Alert, Awake, Oriented times 3, no distress  Skin: Warm and dry  Head: Normocephalic. No masses, lesions or tenderness noted  Eyes: Conjunctivae appear normal. PERLE  Ears: External ears normal  Nose/Sinuses: Nares normal. Septum midline. Mucosa normal. No drainage  Oropharynx: Oropharynx clear with no exudate seen  Neck: Neck supple. No jugular venous distension, lymphadenopathy or thyromegaly Trachea midline  Chest:  Normal. Movements are Normal and Equal.  Lungs: Lungs clear to auscultation bilaterally. No ronchi, crackles or wheezes  Heart: S1 S2  Regular rate and rhythm. No rub, murmur or gallop  Abdomen: Abdomen soft, non-tender. BS normal. No masses, organomegaly. Back: Grossly Normal and Equal. DTR are Normal. SLR is Normal.  Extremities: Arthritic changes are noted. Movements are limited. Pedal pulses are normal.  Musculoskeletal: Muscular strength appears intact. No joint effusion, tenderness, swelling or warmth  Neuro:  No focal motor or sensory deficits        ASSESSMENT     Patient Active Problem List    Diagnosis Date Noted    Lumbar strain 11/12/2013    Obesity (BMI 30.0-34.9) 11/12/2013    Primary hypercholesterolemia 06/13/2013    Mild chronic obstructive pulmonary disease ([address] 06/10/2021    H/O: gout 06/10/2021    Pulmonary embolism ([facility] 06/10/2021    Allergic rhinitis due to allergen 02/11/2020        Diagnosis:     ICD-10-CM    1.  Primary hypercholesterolemia  E78.00 Lipid Panel    DIET CONTROLLED   2. Obesity (BMI 30.0-34. 9)  E66.9     STABLE   3. Saddle embolus of pulmonary artery without acute cor pulmonale, unspecified chronicity (HCC)  I26.92     STABLE   4. Hypovitaminosis D  E55.9 Vitamin D 25 Hydroxy    LOW   5. Screening mammogram for breast cancer  Z12.31 DAMON DIGITAL SCREEN BILATERAL PER PROTOCOL       PLAN:           Patient Instructions   LOW CARBOHYDRATE FOR  WEIGHT CONTROL. LOW FAT DIET FOR CHOLESTEROL CONTROL. TAKE ELIQUIS AS RECOMMENDED. DISCUSS CONTINUED USE WITH PULMONOLOGIST AND HEMATOLOGIST. TAKE  VITAMIN D-3 01076 UNITS WEEKLY FOR IMPROVING DEFICIENCY. REGULAR WALKING ADVISED. ADVISED WEIGHT REDUCTION. MAMMOGRAM AS SCHEDULED. FASTING FOR LAB WORK PRIOR TO NEXT VISIT. NEXT APPOINTMENT IN 3 MONTHS. Return in about 3 months (around 2/11/2022) for FOLLOW UP VISIT. I have reviewed my findings and recommendations with Fabrizio Hernandez.     Electronically signed by Justice Beebe MD on 11/11/21 at 4:39 PM EST

## 2022-01-03 ENCOUNTER — HOSPITAL ENCOUNTER (OUTPATIENT)
Dept: GENERAL RADIOLOGY | Age: 62
Discharge: HOME | End: 2022-01-05
Payer: COMMERCIAL

## 2022-01-03 DIAGNOSIS — Z12.31 SCREENING MAMMOGRAM FOR BREAST CANCER: ICD-10-CM

## 2022-01-03 PROCEDURE — 77063 BREAST TOMOSYNTHESIS BI: CPT

## 2022-02-01 ENCOUNTER — HOSPITAL ENCOUNTER (OUTPATIENT)
Dept: CT IMAGING | Age: 62
Discharge: HOME | End: 2022-02-01
Payer: COMMERCIAL

## 2022-02-01 ENCOUNTER — HOSPITAL ENCOUNTER (OUTPATIENT)
Age: 62
Discharge: HOME | End: 2022-02-01
Payer: COMMERCIAL

## 2022-02-01 DIAGNOSIS — E78.00 PRIMARY HYPERCHOLESTEROLEMIA: ICD-10-CM

## 2022-02-01 DIAGNOSIS — R91.8 OTHER NONSPECIFIC ABNORMAL FINDING OF LUNG FIELD: ICD-10-CM

## 2022-02-01 DIAGNOSIS — E55.9 HYPOVITAMINOSIS D: ICD-10-CM

## 2022-02-01 LAB
CHOLESTEROL, TOTAL: 194 MG/DL (ref 0–199)
HDLC SERPL-MCNC: 58 MG/DL
LDL CHOLESTEROL CALCULATED: 110 MG/DL (ref 0–99)
T4 TOTAL: 6.7 MCG/DL (ref 4.5–11.7)
TRIGL SERPL-MCNC: 128 MG/DL (ref 0–149)
TSH SERPL DL<=0.05 MIU/L-ACNC: 3.94 UIU/ML (ref 0.27–4.2)
VITAMIN D 25-HYDROXY: 27 NG/ML (ref 30–100)
VLDLC SERPL CALC-MCNC: 26 MG/DL

## 2022-02-01 PROCEDURE — 71250 CT THORAX DX C-: CPT

## 2022-02-01 PROCEDURE — 82306 VITAMIN D 25 HYDROXY: CPT

## 2022-02-01 PROCEDURE — 84443 ASSAY THYROID STIM HORMONE: CPT

## 2022-02-01 PROCEDURE — 36415 COLL VENOUS BLD VENIPUNCTURE: CPT

## 2022-02-01 PROCEDURE — 84436 ASSAY OF TOTAL THYROXINE: CPT

## 2022-02-01 PROCEDURE — 80061 LIPID PANEL: CPT

## 2022-02-02 NOTE — RESULT ENCOUNTER NOTE
Eli Hicks VITAMIN D LEVEL LOW. TAKE VITAMIN D-3 2000 UNITS DAILY. LDL CHOLESTEROL HIGH. LOW SALT, LOW CARB. AND LOW FAT DIET. REGULAR EXERCISE. CONTINUE CURRENT MEDICATIONS.

## 2022-02-08 ENCOUNTER — OFFICE VISIT (OUTPATIENT)
Dept: FAMILY MEDICINE CLINIC | Age: 62
End: 2022-02-08
Payer: COMMERCIAL

## 2022-02-08 VITALS
WEIGHT: 198 LBS | HEART RATE: 73 BPM | DIASTOLIC BLOOD PRESSURE: 70 MMHG | BODY MASS INDEX: 32.95 KG/M2 | SYSTOLIC BLOOD PRESSURE: 104 MMHG | OXYGEN SATURATION: 98 %

## 2022-02-08 DIAGNOSIS — E55.9 HYPOVITAMINOSIS D: ICD-10-CM

## 2022-02-08 DIAGNOSIS — E78.00 PRIMARY HYPERCHOLESTEROLEMIA: Primary | ICD-10-CM

## 2022-02-08 DIAGNOSIS — I26.92 SADDLE EMBOLUS OF PULMONARY ARTERY WITHOUT ACUTE COR PULMONALE, UNSPECIFIED CHRONICITY (HCC): ICD-10-CM

## 2022-02-08 DIAGNOSIS — E66.9 OBESITY (BMI 30.0-34.9): ICD-10-CM

## 2022-02-08 PROCEDURE — 99213 OFFICE O/P EST LOW 20 MIN: CPT | Performed by: FAMILY MEDICINE

## 2022-02-08 RX ORDER — APIXABAN 5 MG/1
TABLET, FILM COATED ORAL
COMMUNITY
Start: 2022-02-04 | End: 2022-02-08 | Stop reason: SDUPTHER

## 2022-02-08 RX ORDER — ERGOCALCIFEROL 1.25 MG/1
50000 CAPSULE ORAL WEEKLY
Qty: 12 CAPSULE | Refills: 1 | Status: SHIPPED
Start: 2022-02-08 | End: 2022-07-25 | Stop reason: SDUPTHER

## 2022-02-08 RX ORDER — APIXABAN 5 MG/1
5 TABLET, FILM COATED ORAL 2 TIMES DAILY
Qty: 60 TABLET | Refills: 1 | Status: SHIPPED
Start: 2022-02-08 | End: 2022-05-02 | Stop reason: SDUPTHER

## 2022-02-08 NOTE — PATIENT INSTRUCTIONS
LOW CARBOHYDRATE FOR  WEIGHT CONTROL. LOW FAT DIET FOR CHOLESTEROL CONTROL. TAKE ELIQUIS AS RECOMMENDED. TAKE  VITAMIN D-3 49119 UNITS WEEKLY FOR IMPROVING DEFICIENCY. REGULAR WALKING ADVISED. ADVISED WEIGHT REDUCTION. FASTING FOR LAB WORK PRIOR TO NEXT VISIT. NEXT APPOINTMENT IN 3 MONTHS.

## 2022-02-08 NOTE — PROGRESS NOTES
OFFICE PROGRESS NOTE      SUBJECTIVE:        Patient ID:   Margoth Abbasi is a 59 y.o. female who presents for   Chief Complaint   Patient presents with    Discuss Labs    COPD    Pain     left side           HPI:     RECHECK  VITAMIN D, CHOLESTEROL AND PULMONARY EMBOLISM. HAS PAIN IN THE LEFT UPPER QUADRANT OF THE ABDOMEN. SEEN GASTROENTEROLOGIST ONCE BEFORE BUT TRY REACHING OUT TO HIM AGAIN. MEDICATION REFILL. FEELS GOOD. WATCHING DIET GOOD. WALKING FOR EXERCISE. TAKING MEDICATIONS REGULARLY. Prior to Admission medications    Medication Sig Start Date End Date Taking?  Authorizing Provider   ELIQUIS 5 MG TABS tablet Take 1 tablet by mouth 2 times daily 2/8/22  Yes Sydney Saavedra MD   vitamin D (ERGOCALCIFEROL) 1.25 MG (06073 UT) CAPS capsule Take 1 capsule by mouth once a week 9/2/21  Yes Sydney Saavedra MD   Multiple Vitamins-Minerals (THERAPEUTIC MULTIVITAMIN-MINERALS) tablet Take 1 tablet by mouth daily   Yes Historical Provider, MD   albuterol sulfate  (90 Base) MCG/ACT inhaler Inhale 1 puff into the lungs every 4 hours as needed for Wheezing 6/10/21  Yes Sydney Saavedra MD   apixaban Reina Goodson) 5 MG TABS tablet Take 1 tablet by mouth 2 times daily 11/2/21 2/8/23  Matthieu Rincon, APRN - CNP     Social History     Socioeconomic History    Marital status: Single     Spouse name: Not on file    Number of children: Not on file    Years of education: Not on file    Highest education level: Not on file   Occupational History    Not on file   Tobacco Use    Smoking status: Never Smoker    Smokeless tobacco: Never Used   Vaping Use    Vaping Use: Never used   Substance and Sexual Activity    Alcohol use: No    Drug use: No    Sexual activity: Not on file   Other Topics Concern    Not on file   Social History Narrative    Not on file     Social Determinants of Health     Financial Resource Strain: Low Risk     Difficulty of Paying Living Expenses: Not hard at all   Food Insecurity: No Food Insecurity    Worried About [facility] in the Last Year: Never true    Josephine of Food in the Last Year: Never true   Transportation Needs: No Transportation Needs    Lack of Transportation (Medical): No    Lack of Transportation (Non-Medical): No   Physical Activity:     Days of Exercise per Week: Not on file    Minutes of Exercise per Session: Not on file   Stress:     Feeling of Stress : Not on file   Social Connections:     Frequency of Communication with Friends and Family: Not on file    Frequency of Social Gatherings with Friends and Family: Not on file    Attends [employer] Services: Not on file    Active Member of [address] [place] [address] or Organizations: Not on file    Attends Club or Organization Meetings: Not on file    Marital Status: Not on file   Intimate Partner Violence:     Fear of Current or Ex-Partner: Not on file    Emotionally Abused: Not on file    Physically Abused: Not on file    Sexually Abused: Not on file   Housing Stability: [address] Unable to Pay for Housing in the Last Year: No    Number of [place] in the Last Year: 1    Unstable Housing in the Last Year: No       I have reviewed Beckie allergies, medications, problem list, medical, social and family history and have updated as needed in the electronic medical record  Review Of Systems:    Skin: no abnormal pigmentation, rash, scaling, itching, masses, hair or nail changes  Eyes: no blurring, diplopia, or eye pain  Ears/Nose/Throat: no hearing loss, tinnitus, vertigo, nosebleed, nasal congestion, rhinorrhea, sore throat  Respiratory: no cough, pleuritic chest pain, dyspnea, or wheezing  Cardiovascular: no chest pain, angina, dyspnea on exertion, orthopnea, PND, palpitations, or claudication  Gastrointestinal: no nausea, vomiting, heartburn, diarrhea, constipation, bloating,  abdominal pain, or blood per rectum. Appetite is good  Genitourinary: no urinary urgency, frequency, dysuria, nocturia, hesitancy, or incontinence  Musculoskeletal: joint pains off and on. Morning stiffness. Ambulating well  Neurologic: no paralysis, paresis, paresthesia, seizures, tremors, or headaches  Hematologic/Lymphatic/Immunologic: no anemia, abnormal bleeding/bruising, fever, chills, night sweats, swollen glands, or unexplained weight loss  Endocrine: no heat or cold intolerance and no polyphagia, polydipsia, or polyuria        OBJECTIVE:     VS:  Wt Readings from Last 3 Encounters:   02/08/22 198 lb (89.8 kg)   11/11/21 200 lb (90.7 kg)   09/08/21 189 lb 3.2 oz (85.8 kg)     Temp Readings from Last 3 Encounters:   11/11/21 98 °F (36.7 °C)   09/08/21 97.1 °F (36.2 °C)   09/02/21 97 °F (36.1 °C)     BP Readings from Last 3 Encounters:   02/08/22 104/70   11/11/21 110/80   09/08/21 127/89        General appearance: Alert, Awake, Oriented times 3, no distress  Skin: Warm and dry  Head: Normocephalic. No masses, lesions or tenderness noted  Eyes: Conjunctivae appear normal. PERLE  Ears: External ears normal  Nose/Sinuses: Nares normal. Septum midline. Mucosa normal. No drainage  Oropharynx: Oropharynx clear with no exudate seen  Neck: Neck supple. No jugular venous distension, lymphadenopathy or thyromegaly Trachea midline  Chest:  Normal. Movements are Normal and Equal.  Lungs: Lungs clear to auscultation bilaterally. No ronchi, crackles or wheezes  Heart: S1 S2  Regular rate and rhythm. No rub, murmur or gallop  Abdomen: Abdomen soft, non-tender. BS normal. No masses, organomegaly. Back: Grossly Normal and Equal. DTR are Normal. SLR is Normal.  Extremities: Arthritic changes are noted. Movements are limited. Pedal pulses are normal.  Musculoskeletal: Muscular strength appears intact.  No joint effusion, tenderness, swelling or warmth  Neuro:  No focal motor or sensory deficits        ASSESSMENT     Patient Active Problem List    Diagnosis Date Noted    Lumbar strain 11/12/2013    Obesity (BMI 30.0-34.9) 11/12/2013    Primary hypercholesterolemia 06/13/2013    Mild chronic obstructive pulmonary disease ([facility] [address] 06/10/2021    H/O: gout 06/10/2021    Pulmonary embolism ([facility] 06/10/2021    Allergic rhinitis due to allergen 02/11/2020        Diagnosis:     ICD-10-CM    1. Primary hypercholesterolemia  E78.00     FAIR CONTROL   2. Saddle embolus of pulmonary artery without acute cor pulmonale, unspecified chronicity (HCC)  I26.92     STABLE   3. Hypovitaminosis D  E55.9     LOW   4. Obesity (BMI 30.0-34. 9)  E66.9     STABLE. PLAN:           Patient Instructions   LOW CARBOHYDRATE FOR  WEIGHT CONTROL. LOW FAT DIET FOR CHOLESTEROL CONTROL. TAKE ELIQUIS AS RECOMMENDED. TAKE  VITAMIN D-3 85278 UNITS WEEKLY FOR IMPROVING DEFICIENCY. REGULAR WALKING ADVISED. ADVISED WEIGHT REDUCTION. FASTING FOR LAB WORK PRIOR TO NEXT VISIT. NEXT APPOINTMENT IN 3 MONTHS.       Return in about 3 months (around 5/8/2022) for FOLLOW UP VISIT. I have reviewed my findings and recommendations with Ashley Harper.     Electronically signed by Jas Justin MD on 2/8/22 at 3:19 PM EST

## 2022-04-29 DIAGNOSIS — E78.00 PRIMARY HYPERCHOLESTEROLEMIA: ICD-10-CM

## 2022-04-29 DIAGNOSIS — E55.9 HYPOVITAMINOSIS D: ICD-10-CM

## 2022-04-29 LAB
ALBUMIN SERPL-MCNC: 4.3 G/DL (ref 3.5–5.2)
ALP BLD-CCNC: 104 U/L (ref 35–104)
ALT SERPL-CCNC: 16 U/L (ref 0–32)
ANION GAP SERPL CALCULATED.3IONS-SCNC: 9 MMOL/L (ref 7–16)
AST SERPL-CCNC: 18 U/L (ref 0–31)
BILIRUB SERPL-MCNC: 0.3 MG/DL (ref 0–1.2)
BUN BLDV-MCNC: 13 MG/DL (ref 6–23)
CALCIUM SERPL-MCNC: 9.6 MG/DL (ref 8.6–10.2)
CHLORIDE BLD-SCNC: 104 MMOL/L (ref 98–107)
CHOLESTEROL, TOTAL: 212 MG/DL (ref 0–199)
CO2: 26 MMOL/L (ref 22–29)
CREAT SERPL-MCNC: 0.9 MG/DL (ref 0.5–1)
GFR AFRICAN AMERICAN: >60
GFR NON-AFRICAN AMERICAN: >60 ML/MIN/1.73
GLUCOSE BLD-MCNC: 88 MG/DL (ref 74–99)
HDLC SERPL-MCNC: 60 MG/DL
LDL CHOLESTEROL CALCULATED: 132 MG/DL (ref 0–99)
POTASSIUM SERPL-SCNC: 4.1 MMOL/L (ref 3.5–5)
SODIUM BLD-SCNC: 139 MMOL/L (ref 132–146)
TOTAL PROTEIN: 7.1 G/DL (ref 6.4–8.3)
TRIGL SERPL-MCNC: 98 MG/DL (ref 0–149)
VITAMIN D 25-HYDROXY: 27 NG/ML (ref 30–100)
VLDLC SERPL CALC-MCNC: 20 MG/DL

## 2022-05-02 ENCOUNTER — OFFICE VISIT (OUTPATIENT)
Dept: FAMILY MEDICINE CLINIC | Age: 62
End: 2022-05-02
Payer: COMMERCIAL

## 2022-05-02 VITALS
DIASTOLIC BLOOD PRESSURE: 78 MMHG | SYSTOLIC BLOOD PRESSURE: 110 MMHG | OXYGEN SATURATION: 99 % | TEMPERATURE: 97.3 F | BODY MASS INDEX: 33.45 KG/M2 | WEIGHT: 201 LBS | HEART RATE: 82 BPM

## 2022-05-02 DIAGNOSIS — E66.9 OBESITY (BMI 30.0-34.9): ICD-10-CM

## 2022-05-02 DIAGNOSIS — J44.9 MILD CHRONIC OBSTRUCTIVE PULMONARY DISEASE (HCC): ICD-10-CM

## 2022-05-02 DIAGNOSIS — I26.92 SADDLE EMBOLUS OF PULMONARY ARTERY WITHOUT ACUTE COR PULMONALE, UNSPECIFIED CHRONICITY (HCC): ICD-10-CM

## 2022-05-02 DIAGNOSIS — E78.00 PRIMARY HYPERCHOLESTEROLEMIA: Primary | ICD-10-CM

## 2022-05-02 DIAGNOSIS — E55.9 HYPOVITAMINOSIS D: ICD-10-CM

## 2022-05-02 PROCEDURE — 99214 OFFICE O/P EST MOD 30 MIN: CPT | Performed by: FAMILY MEDICINE

## 2022-05-02 NOTE — PROGRESS NOTES
OFFICE PROGRESS NOTE      SUBJECTIVE:        Patient ID:   Brayden Ramsay is a 59 y.o. female who presents for   Chief Complaint   Patient presents with    Pulmonary Embolism    Cholesterol Problem           HPI:     RECHECK PE, COPD, CHOLESTEROL AND VITAMIN D.  MEDICATION REFILL. FEELS GOOD. STARTED WATCHING DIET GOOD. WALKING FOR EXERCISE. TAKING MEDICATIONS REGULARLY. DOES NOT WANT TO START TAKING CHOLESTEROL LOWERING MEDICATION AT THIS TIME. Prior to Admission medications    Medication Sig Start Date End Date Taking?  Authorizing Provider   apixaban (ELIQUIS) 5 MG TABS tablet Take 1 tablet by mouth 2 times daily 5/2/22 6/1/22 Yes Sridhar Linton MD   vitamin D (ERGOCALCIFEROL) 1.25 MG (75379 UT) CAPS capsule Take 1 capsule by mouth once a week 2/8/22  Yes Sridhar Linton MD   Multiple Vitamins-Minerals (THERAPEUTIC MULTIVITAMIN-MINERALS) tablet Take 1 tablet by mouth daily   Yes Historical Provider, MD   albuterol sulfate  (90 Base) MCG/ACT inhaler Inhale 1 puff into the lungs every 4 hours as needed for Wheezing 6/10/21  Yes Sridhar Linton MD     Social History     Socioeconomic History    Marital status: Single     Spouse name: Not on file    Number of children: Not on file    Years of education: Not on file    Highest education level: Not on file   Occupational History    Not on file   Tobacco Use    Smoking status: Never Smoker    Smokeless tobacco: Never Used   Vaping Use    Vaping Use: Never used   Substance and Sexual Activity    Alcohol use: No    Drug use: No    Sexual activity: Not on file   Other Topics Concern    Not on file   Social History Narrative    Not on file     Social Determinants of Health     Financial Resource Strain: Low Risk     Difficulty of Paying Living Expenses: Not hard at all   Food Insecurity: No Food Insecurity    Worried About [address] in the Last Year: Never true    [address] [facility] [address] [facility] [address] in the Last Year: Never true   Transportation Needs: No Transportation Needs    Lack of Transportation (Medical): No    Lack of Transportation (Non-Medical): No   Physical Activity:     Days of Exercise per Week: Not on file    Minutes of Exercise per Session: Not on file   Stress:     Feeling of Stress : Not on file   Social Connections:     Frequency of Communication with Friends and Family: Not on file    Frequency of Social Gatherings with Friends and Family: Not on file    Attends Nondenominational Services: Not on file    Active Member of [address] [place] [address] or Organizations: Not on file    Attends Club or Organization Meetings: Not on file    Marital Status: Not on file   Intimate Partner Violence:     Fear of Current or Ex-Partner: Not on file    Emotionally Abused: Not on file    Physically Abused: Not on file    Sexually Abused: Not on file   Housing Stability: [address] Unable to Pay for Housing in the Last Year: No    Number of [place] in the Last Year: 1    Unstable Housing in the Last Year: No       I have reviewed Viviana's allergies, medications, problem list, medical, social and family history and have updated as needed in the electronic medical record  Review Of Systems:    Skin: no abnormal pigmentation, rash, scaling, itching, masses, hair or nail changes  Eyes: no blurring, diplopia, or eye pain  Ears/Nose/Throat: no hearing loss, tinnitus, vertigo, nosebleed, nasal congestion, rhinorrhea, sore throat  Respiratory: no cough, pleuritic chest pain, dyspnea, or wheezing  Cardiovascular: no chest pain, angina, dyspnea on exertion, orthopnea, PND, palpitations, or claudication  Gastrointestinal: no nausea, vomiting, heartburn, diarrhea, constipation, bloating,  abdominal pain, or blood per rectum. Appetite is good  Genitourinary: no urinary urgency, frequency, dysuria, nocturia, hesitancy, or incontinence  Musculoskeletal: joint pains off and on. Morning stiffness.  Ambulating well  Neurologic: no paralysis, paresis, paresthesia, seizures, tremors, or headaches  Hematologic/Lymphatic/Immunologic: no anemia, abnormal bleeding/bruising, fever, chills, night sweats, swollen glands, or unexplained weight loss  Endocrine: no heat or cold intolerance and no polyphagia, polydipsia, or polyuria        OBJECTIVE:     VS:  Wt Readings from Last 3 Encounters:   05/02/22 201 lb (91.2 kg)   02/08/22 198 lb (89.8 kg)   11/11/21 200 lb (90.7 kg)     Temp Readings from Last 3 Encounters:   05/02/22 97.3 °F (36.3 °C)   11/11/21 98 °F (36.7 °C)   09/08/21 97.1 °F (36.2 °C)     BP Readings from Last 3 Encounters:   05/02/22 110/78   02/08/22 104/70   11/11/21 110/80        General appearance: Alert, Awake, Oriented times 3, no distress  Skin: Warm and dry  Head: Normocephalic. No masses, lesions or tenderness noted  Eyes: Conjunctivae appear normal. PERLE  Ears: External ears normal  Nose/Sinuses: Nares normal. Septum midline. Mucosa normal. No drainage  Oropharynx: Oropharynx clear with no exudate seen  Neck: Neck supple. No jugular venous distension, lymphadenopathy or thyromegaly Trachea midline  Chest:  Normal. Movements are Normal and Equal.  Lungs: Lungs clear to auscultation bilaterally. No ronchi, crackles or wheezes  Heart: S1 S2  Regular rate and rhythm. No rub, murmur or gallop  Abdomen: Abdomen soft, non-tender. BS normal. No masses, organomegaly. Back: Grossly Normal and Equal. DTR are Normal. SLR is Normal.  Extremities: Arthritic changes are noted. Movements are limited. Pedal pulses are normal.  Musculoskeletal: Muscular strength appears intact.  No joint effusion, tenderness, swelling or warmth  Neuro:  No focal motor or sensory deficits        ASSESSMENT     Patient Active Problem List    Diagnosis Date Noted    Lumbar strain 11/12/2013    Obesity (BMI 30.0-34.9) 11/12/2013    Primary hypercholesterolemia 06/13/2013    Mild chronic obstructive pulmonary disease ([facility] 06/10/2021    H/O: gout 06/10/2021  Pulmonary embolism ([facility] [address] [address] 06/10/2021    Allergic rhinitis due to allergen 02/11/2020        Diagnosis:     ICD-10-CM    1. Primary hypercholesterolemia  E78.00 Lipid Panel     Vitamin D 25 Hydroxy    UNCONTROLLED   2. Saddle embolus of pulmonary artery without acute cor pulmonale, unspecified chronicity (HCC)  I26.92     STABLE   3. Obesity (BMI 30.0-34. 9)  E66.9     STABLE   4. Mild chronic obstructive pulmonary disease (HCC)  J44.9     CONTROLLED   5. Hypovitaminosis D  E55.9     LOW       PLAN:           Patient Instructions   LOW CARBOHYDRATE FOR  WEIGHT CONTROL. LOW FAT DIET FOR CHOLESTEROL CONTROL. TAKE ELIQUIS 5 MG. 2 TIMES A DAY  TAKE  VITAMIN D-3 33950 UNITS WEEKLY FOR IMPROVING DEFICIENCY. REGULAR WALKING ADVISED. ADVISED WEIGHT REDUCTION. FASTING FOR LAB WORK PRIOR TO NEXT VISIT. NEXT APPOINTMENT IN 3 MONTHS.       Return in about 3 months (around 8/2/2022) for FOLLOW UP VISIT. I have reviewed my findings and recommendations with Genie Thomas.     Electronically signed by Isaiah Rodas MD on 5/2/22 at 2:59 PM EDT

## 2022-05-02 NOTE — PATIENT INSTRUCTIONS
LOW CARBOHYDRATE FOR  WEIGHT CONTROL. LOW FAT DIET FOR CHOLESTEROL CONTROL. TAKE ELIQUIS 5 MG. 2 TIMES A DAY  TAKE  VITAMIN D-3 72881 UNITS WEEKLY FOR IMPROVING DEFICIENCY. REGULAR WALKING ADVISED. ADVISED WEIGHT REDUCTION. FASTING FOR LAB WORK PRIOR TO NEXT VISIT. NEXT APPOINTMENT IN 3 MONTHS.

## 2022-05-02 NOTE — RESULT ENCOUNTER NOTE
VITAMIN D LEVEL LOW. TAKE VITAMIN D-3 56318 UNITS WEEKLY. LDL CHOLESTEROL LEVEL HIGH. LOW SALT, LOW CARB. AND LOW FAT DIET. REGULAR EXERCISE. CONTINUE CURRENT MEDICATIONS. PLEASE ACKNOWLEDGE RECEIPT OF INFORMATION BY REPLYING THE MESSAGE. THANKS.

## 2022-07-25 RX ORDER — ERGOCALCIFEROL 1.25 MG/1
50000 CAPSULE ORAL WEEKLY
Qty: 12 CAPSULE | Refills: 1 | Status: SHIPPED | OUTPATIENT
Start: 2022-07-25

## 2022-07-25 NOTE — TELEPHONE ENCOUNTER
Patient called for refill  Last seen 5/2/2022  Next appt 8/1/2022  [facility]

## 2022-07-27 ENCOUNTER — TELEPHONE (OUTPATIENT)
Dept: FAMILY MEDICINE CLINIC | Age: 62
End: 2022-07-27

## 2022-07-27 NOTE — TELEPHONE ENCOUNTER
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 I RS appt to 11/15/22. Patient informed she has labs to do prior to appt. Noted labs  22. Please advise when patient should have labs done, and if needed, place new orders.     Last seen 2022  Next appt 11/15/2022

## 2022-07-29 DIAGNOSIS — E78.00 PRIMARY HYPERCHOLESTEROLEMIA: Primary | ICD-10-CM

## 2022-07-29 DIAGNOSIS — E55.9 HYPOVITAMINOSIS D: ICD-10-CM

## 2022-10-12 ENCOUNTER — HOSPITAL ENCOUNTER (OUTPATIENT)
Dept: NON INVASIVE DIAGNOSTICS | Age: 62
Discharge: HOME | End: 2022-10-12
Payer: COMMERCIAL

## 2022-10-12 LAB
LV EF: 67 %
LVEF MODALITY: NORMAL

## 2022-10-12 PROCEDURE — 93306 TTE W/DOPPLER COMPLETE: CPT

## 2022-11-10 DIAGNOSIS — E78.00 PRIMARY HYPERCHOLESTEROLEMIA: ICD-10-CM

## 2022-11-10 DIAGNOSIS — E55.9 HYPOVITAMINOSIS D: ICD-10-CM

## 2022-11-10 LAB
CHOLESTEROL, TOTAL: 232 MG/DL (ref 0–199)
HDLC SERPL-MCNC: 58 MG/DL
LDL CHOLESTEROL CALCULATED: 153 MG/DL (ref 0–99)
TRIGL SERPL-MCNC: 104 MG/DL (ref 0–149)
VITAMIN D 25-HYDROXY: 25 NG/ML (ref 30–100)
VLDLC SERPL CALC-MCNC: 21 MG/DL

## 2022-11-11 NOTE — RESULT ENCOUNTER NOTE
VITAMIN D LEVEL LOW. TAKE VITAMIN D-3 83606 UNITS WEEKLY. LDL CHOLESTEROL LEVEL ARE HIGH. RECOMMEND:   LOW FAT DIET. REGULAR EXERCISE. CONTINUE CURRENT MEDICATIONS. PLEASE ACKNOWLEDGE RECEIPT OF INFORMATION BY REPLYING THE MESSAGE. THANKS.

## 2022-11-15 ENCOUNTER — OFFICE VISIT (OUTPATIENT)
Dept: FAMILY MEDICINE CLINIC | Age: 62
End: 2022-11-15
Payer: COMMERCIAL

## 2022-11-15 VITALS
OXYGEN SATURATION: 98 % | BODY MASS INDEX: 35.49 KG/M2 | HEIGHT: 65 IN | TEMPERATURE: 97 F | DIASTOLIC BLOOD PRESSURE: 70 MMHG | SYSTOLIC BLOOD PRESSURE: 110 MMHG | WEIGHT: 213 LBS | HEART RATE: 96 BPM

## 2022-11-15 DIAGNOSIS — J44.9 MILD CHRONIC OBSTRUCTIVE PULMONARY DISEASE (HCC): ICD-10-CM

## 2022-11-15 DIAGNOSIS — E55.9 HYPOVITAMINOSIS D: ICD-10-CM

## 2022-11-15 DIAGNOSIS — E66.9 OBESITY (BMI 30.0-34.9): ICD-10-CM

## 2022-11-15 DIAGNOSIS — I26.92 SADDLE EMBOLUS OF PULMONARY ARTERY WITHOUT ACUTE COR PULMONALE, UNSPECIFIED CHRONICITY (HCC): ICD-10-CM

## 2022-11-15 DIAGNOSIS — E78.00 PRIMARY HYPERCHOLESTEROLEMIA: Primary | ICD-10-CM

## 2022-11-15 PROCEDURE — 99214 OFFICE O/P EST MOD 30 MIN: CPT | Performed by: FAMILY MEDICINE

## 2022-11-15 RX ORDER — ROSUVASTATIN CALCIUM 10 MG/1
10 TABLET, COATED ORAL NIGHTLY
Qty: 90 TABLET | Refills: 1 | Status: SHIPPED | OUTPATIENT
Start: 2022-11-15

## 2022-11-15 SDOH — ECONOMIC STABILITY: FOOD INSECURITY: WITHIN THE PAST 12 MONTHS, YOU WORRIED THAT YOUR FOOD WOULD RUN OUT BEFORE YOU GOT MONEY TO BUY MORE.: NEVER TRUE

## 2022-11-15 SDOH — ECONOMIC STABILITY: FOOD INSECURITY: WITHIN THE PAST 12 MONTHS, THE FOOD YOU BOUGHT JUST DIDN'T LAST AND YOU DIDN'T HAVE MONEY TO GET MORE.: NEVER TRUE

## 2022-11-15 ASSESSMENT — SOCIAL DETERMINANTS OF HEALTH (SDOH): HOW HARD IS IT FOR YOU TO PAY FOR THE VERY BASICS LIKE FOOD, HOUSING, MEDICAL CARE, AND HEATING?: NOT HARD AT ALL

## 2022-11-15 ASSESSMENT — PATIENT HEALTH QUESTIONNAIRE - PHQ9
1. LITTLE INTEREST OR PLEASURE IN DOING THINGS: 0
2. FEELING DOWN, DEPRESSED OR HOPELESS: 0
SUM OF ALL RESPONSES TO PHQ QUESTIONS 1-9: 0
SUM OF ALL RESPONSES TO PHQ9 QUESTIONS 1 & 2: 0
SUM OF ALL RESPONSES TO PHQ QUESTIONS 1-9: 0

## 2022-11-15 NOTE — PATIENT INSTRUCTIONS
LOW CARBOHYDRATE FOR  WEIGHT CONTROL. LOW FAT DIET FOR CHOLESTEROL CONTROL. TAKE CRESTOR 10 MG. NIGHTLY FOR CHOLESTEROL CONTROL. TAKE ELIQUIS 5 MG. 2 TIMES A DAY  TAKE  VITAMIN D-3 60254 UNITS WEEKLY FOR IMPROVING DEFICIENCY. REGULAR WALKING ADVISED. ADVISED WEIGHT REDUCTION. FASTING FOR LAB WORK PRIOR TO NEXT VISIT. NEXT APPOINTMENT IN 3 MONTHS.

## 2022-11-15 NOTE — PROGRESS NOTES
OFFICE PROGRESS NOTE      SUBJECTIVE:        Patient ID:   Alisson Gee is a 57 y.o. female who presents for   Chief Complaint   Patient presents with    Cholesterol Problem    Pulmonary Embolism           HPI:     RECHECK COPD, PE, CHOLESTEROL AND HYPOVITAMINOSIS D.  SEEN DR. Jaquelin Garcias. HE RECOMMENDED WEIGHT CONTROL AND CONTINUE CURRENT TREATMENT. MEDICATION REFILL. FEELS GOOD. WATCHING DIET GOOD. WALKING FOR EXERCISE. TAKING MEDICATIONS REGULARLY. Prior to Admission medications    Medication Sig Start Date End Date Taking?  Authorizing Provider   apixaban (ELIQUIS) 5 MG TABS tablet Take 1 tablet by mouth 2 times daily 11/15/22 5/14/23 Yes Shweta Ramos MD   rosuvastatin (CRESTOR) 10 MG tablet Take 1 tablet by mouth nightly 11/15/22  Yes Shweta Ramos MD   vitamin D (ERGOCALCIFEROL) 1.25 MG (02516 UT) CAPS capsule Take 1 capsule by mouth once a week 7/25/22  Yes Shweta Ramos MD   Multiple Vitamins-Minerals (THERAPEUTIC MULTIVITAMIN-MINERALS) tablet Take 1 tablet by mouth daily   Yes Historical Provider, MD   albuterol sulfate  (90 Base) MCG/ACT inhaler Inhale 1 puff into the lungs every 4 hours as needed for Wheezing 6/10/21  Yes Shweta Ramos MD     Social History     Socioeconomic History    Marital status: Single   Tobacco Use    Smoking status: Never    Smokeless tobacco: Never   Vaping Use    Vaping Use: Never used   Substance and Sexual Activity    Alcohol use: No    Drug use: No     Social Determinants of Health     Financial Resource Strain: Low Risk     Difficulty of Paying Living Expenses: Not hard at all   Food Insecurity: No Food Insecurity    Worried About Running Out of Food in the Last Year: Never true    Ran Out of Food in the Last Year: Never true       I have reviewed Viviana's allergies, medications, problem list, medical, social and family history and have updated as needed in the electronic medical record  Review Of Systems:    Skin: no abnormal pigmentation, rash, scaling, itching, masses, hair or nail changes  Eyes: no blurring, diplopia, or eye pain  Ears/Nose/Throat: no hearing loss, tinnitus, vertigo, nosebleed, nasal congestion, rhinorrhea, sore throat  Respiratory: no cough, pleuritic chest pain, dyspnea, or wheezing  Cardiovascular: no chest pain, angina, dyspnea on exertion, orthopnea, PND, palpitations, or claudication  Gastrointestinal: no nausea, vomiting, heartburn, diarrhea, constipation, bloating,  abdominal pain, or blood per rectum. Appetite is good  Genitourinary: no urinary urgency, frequency, dysuria, nocturia, hesitancy, or incontinence  Musculoskeletal: joint pains off and on. Morning stiffness. Ambulating well  Neurologic: no paralysis, paresis, paresthesia, seizures, tremors, or headaches  Hematologic/Lymphatic/Immunologic: no anemia, abnormal bleeding/bruising, fever, chills, night sweats, swollen glands, or unexplained weight loss  Endocrine: no heat or cold intolerance and no polyphagia, polydipsia, or polyuria        OBJECTIVE:     VS:  Wt Readings from Last 3 Encounters:   11/15/22 213 lb (96.6 kg)   05/02/22 201 lb (91.2 kg)   02/08/22 198 lb (89.8 kg)     Temp Readings from Last 3 Encounters:   11/15/22 97 °F (36.1 °C)   05/02/22 97.3 °F (36.3 °C)   11/11/21 98 °F (36.7 °C)     BP Readings from Last 3 Encounters:   11/15/22 110/70   05/02/22 110/78   02/08/22 104/70        General appearance: Alert, Awake, Oriented times 3, no distress  Skin: Warm and dry  Head: Normocephalic. No masses, lesions or tenderness noted  Eyes: Conjunctivae appear normal. PERLE  Ears: External ears normal  Nose/Sinuses: Nares normal. Septum midline. Mucosa normal. No drainage  Oropharynx: Oropharynx clear with no exudate seen  Neck: Neck supple.  No jugular venous distension, lymphadenopathy or thyromegaly Trachea midline  Chest:  Normal. Movements are Normal and Equal.  Lungs: Lungs clear to auscultation bilaterally. No ronchi, crackles or wheezes  Heart: S1 S2  Regular rate and rhythm. No rub, murmur or gallop  Abdomen: Abdomen soft, non-tender. BS normal. No masses, organomegaly. Back: Grossly Normal and Equal. DTR are Normal. SLR is Normal.  Extremities: Arthritic changes are noted. Movements are limited. Pedal pulses are normal.  Musculoskeletal: Muscular strength appears intact. No joint effusion, tenderness, swelling or warmth  Neuro:  No focal motor or sensory deficits        ASSESSMENT     Patient Active Problem List    Diagnosis Date Noted    Lumbar strain 11/12/2013    Obesity (BMI 30.0-34.9) 11/12/2013    Primary hypercholesterolemia 06/13/2013    Mild chronic obstructive pulmonary disease ([address] 06/10/2021    H/O: gout 06/10/2021    Pulmonary embolism ([facility] [address] 06/10/2021    Allergic rhinitis due to allergen 02/11/2020        Diagnosis:     ICD-10-CM    1. Primary hypercholesterolemia  E78.00 Comprehensive Metabolic Panel     Lipid Panel    UNCONTROLLED      2. Hypovitaminosis D  E55.9 Vitamin D 25 Hydroxy    LOW      3. Saddle embolus of pulmonary artery without acute cor pulmonale, unspecified chronicity (HCC)  I26.92     STABLE      4. Obesity (BMI 30.0-34. 9)  E66.9     PROGRESSIVE. 5. Mild chronic obstructive pulmonary disease ([address]  J44.9     STABLE          PLAN:     CONSULT NOTE BY DR SCHROEDER 11/7/2022 REVIEWED AND DISCUSSED WITH PATIENT. Patient Instructions   LOW CARBOHYDRATE FOR  WEIGHT CONTROL. LOW FAT DIET FOR CHOLESTEROL CONTROL. TAKE CRESTOR 10 MG. NIGHTLY FOR CHOLESTEROL CONTROL. TAKE ELIQUIS 5 MG. 2 TIMES A DAY  TAKE  VITAMIN D-3 18648 UNITS WEEKLY FOR IMPROVING DEFICIENCY. REGULAR WALKING ADVISED. ADVISED WEIGHT REDUCTION. FASTING FOR LAB WORK PRIOR TO NEXT VISIT. NEXT APPOINTMENT IN 3 MONTHS. Return in about 3 months (around 2/15/2023) for FOLLOW UP VISIT. I have reviewed my findings and recommendations with Alisson Gee.     Electronically signed by Hien Lucia MD on 11/15/22 at 1:47 PM EST

## 2022-12-31 RX ORDER — ERGOCALCIFEROL 1.25 MG/1
CAPSULE ORAL
Qty: 12 CAPSULE | Refills: 1 | Status: SHIPPED | OUTPATIENT
Start: 2022-12-31

## 2023-02-28 ENCOUNTER — OFFICE VISIT (OUTPATIENT)
Dept: FAMILY MEDICINE CLINIC | Age: 63
End: 2023-02-28
Payer: COMMERCIAL

## 2023-02-28 VITALS
HEART RATE: 100 BPM | DIASTOLIC BLOOD PRESSURE: 78 MMHG | SYSTOLIC BLOOD PRESSURE: 118 MMHG | WEIGHT: 210 LBS | OXYGEN SATURATION: 100 % | BODY MASS INDEX: 34.95 KG/M2

## 2023-02-28 DIAGNOSIS — R74.8 ELEVATED LIVER ENZYMES: ICD-10-CM

## 2023-02-28 DIAGNOSIS — Z12.31 SCREENING MAMMOGRAM FOR BREAST CANCER: ICD-10-CM

## 2023-02-28 DIAGNOSIS — E66.9 OBESITY (BMI 30.0-34.9): ICD-10-CM

## 2023-02-28 DIAGNOSIS — J44.9 MILD CHRONIC OBSTRUCTIVE PULMONARY DISEASE (HCC): ICD-10-CM

## 2023-02-28 DIAGNOSIS — E55.9 HYPOVITAMINOSIS D: ICD-10-CM

## 2023-02-28 DIAGNOSIS — I26.92 SADDLE EMBOLUS OF PULMONARY ARTERY WITHOUT ACUTE COR PULMONALE, UNSPECIFIED CHRONICITY (HCC): ICD-10-CM

## 2023-02-28 DIAGNOSIS — E78.00 PRIMARY HYPERCHOLESTEROLEMIA: Primary | ICD-10-CM

## 2023-02-28 PROCEDURE — 99214 OFFICE O/P EST MOD 30 MIN: CPT | Performed by: FAMILY MEDICINE

## 2023-02-28 RX ORDER — ROSUVASTATIN CALCIUM 5 MG/1
5 TABLET, COATED ORAL DAILY
COMMUNITY
End: 2023-02-28 | Stop reason: SDUPTHER

## 2023-02-28 RX ORDER — ROSUVASTATIN CALCIUM 5 MG/1
5 TABLET, COATED ORAL DAILY
Qty: 90 TABLET | Refills: 0 | Status: SHIPPED | OUTPATIENT
Start: 2023-02-28

## 2023-02-28 RX ORDER — ROSUVASTATIN CALCIUM 5 MG/1
5 TABLET, COATED ORAL NIGHTLY
Qty: 90 TABLET | Refills: 1 | Status: SHIPPED
Start: 2023-02-28 | End: 2023-02-28 | Stop reason: SDUPTHER

## 2023-02-28 RX ORDER — ACETAMINOPHEN 160 MG
1 TABLET,DISINTEGRATING ORAL DAILY
COMMUNITY

## 2023-02-28 SDOH — ECONOMIC STABILITY: INCOME INSECURITY: HOW HARD IS IT FOR YOU TO PAY FOR THE VERY BASICS LIKE FOOD, HOUSING, MEDICAL CARE, AND HEATING?: NOT HARD AT ALL

## 2023-02-28 SDOH — ECONOMIC STABILITY: FOOD INSECURITY: WITHIN THE PAST 12 MONTHS, YOU WORRIED THAT YOUR FOOD WOULD RUN OUT BEFORE YOU GOT MONEY TO BUY MORE.: NEVER TRUE

## 2023-02-28 SDOH — ECONOMIC STABILITY: FOOD INSECURITY: WITHIN THE PAST 12 MONTHS, THE FOOD YOU BOUGHT JUST DIDN'T LAST AND YOU DIDN'T HAVE MONEY TO GET MORE.: NEVER TRUE

## 2023-02-28 ASSESSMENT — PATIENT HEALTH QUESTIONNAIRE - PHQ9
SUM OF ALL RESPONSES TO PHQ9 QUESTIONS 1 & 2: 0
SUM OF ALL RESPONSES TO PHQ QUESTIONS 1-9: 0
1. LITTLE INTEREST OR PLEASURE IN DOING THINGS: 0
2. FEELING DOWN, DEPRESSED OR HOPELESS: 0

## 2023-02-28 NOTE — PROGRESS NOTES
OFFICE PROGRESS NOTE      SUBJECTIVE:        Patient ID:   Tammy Kawasaki is a 57 y.o. female who presents for   Chief Complaint   Patient presents with    Abdominal Pain     Left side           HPI:     RECHECK BP, CHOLESTEROL AND PE.  ABDOMINAL PAIN LEFT SIDE FOR 3 YEARS. SEEN GASTROENTEROLOGIST. HAD SCOPE DONE AND ALSO SOME BLOOD WORK DONE BY HIM. PAIN  WORSE WHEN SHE GETS CONSTIPATED. WILL TRY TAKING STOOL SOFTENER  AS RECOMMENDED. MEDICATION REFILL. FEELS GOOD. WATCHING DIET GOOD. WALKING FOR EXERCISE. TAKING MEDICATIONS REGULARLY. DUE FOR MAMMOGRAM.      Prior to Admission medications    Medication Sig Start Date End Date Taking?  Authorizing Provider   apixaban (ELIQUIS) 5 MG TABS tablet Take 1 tablet by mouth 2 times daily 2/28/23 8/27/23 Yes Maxi Boles MD   rosuvastatin (CRESTOR) 5 MG tablet Take 1 tablet by mouth nightly 2/28/23  Yes Maxi Boles MD   Cholecalciferol (VITAMIN D3) 50 MCG (2000 UT) CAPS Take 1 capsule by mouth daily   Yes Historical Provider, MD   Multiple Vitamins-Minerals (THERAPEUTIC MULTIVITAMIN-MINERALS) tablet Take 1 tablet by mouth daily   Yes Historical Provider, MD   albuterol sulfate  (90 Base) MCG/ACT inhaler Inhale 1 puff into the lungs every 4 hours as needed for Wheezing 6/10/21  Yes Maxi Boles MD     Social History     Socioeconomic History    Marital status: Single   Tobacco Use    Smoking status: Never    Smokeless tobacco: Never   Vaping Use    Vaping Use: Never used   Substance and Sexual Activity    Alcohol use: No    Drug use: No     Social Determinants of Health     Financial Resource Strain: Low Risk     Difficulty of Paying Living Expenses: Not hard at all   Food Insecurity: No Food Insecurity    Worried About Running Out of Food in the Last Year: Never true    [address] in the Last Year: Never true   Transportation Needs: Unknown    Lack of Transportation (Non-Medical): No   Housing Stability: Unknown    Unstable Housing in the Last Year: No       I have reviewed Leahs allergies, medications, problem list, medical, social and family history and have updated as needed in the electronic medical record  Review Of Systems:    Skin: no abnormal pigmentation, rash, scaling, itching, masses, hair or nail changes  Eyes: no blurring, diplopia, or eye pain  Ears/Nose/Throat: no hearing loss, tinnitus, vertigo, nosebleed, nasal congestion, rhinorrhea, sore throat  Respiratory: no cough, pleuritic chest pain, dyspnea, or wheezing  Cardiovascular: no chest pain, angina, dyspnea on exertion, orthopnea, PND, palpitations, or claudication  Gastrointestinal: no nausea, vomiting, heartburn, diarrhea, constipation, bloating,  abdominal pain, or blood per rectum. Appetite is good  Genitourinary: no urinary urgency, frequency, dysuria, nocturia, hesitancy, or incontinence  Musculoskeletal: joint pains off and on. Morning stiffness. Ambulating well  Neurologic: no paralysis, paresis, paresthesia, seizures, tremors, or headaches  Hematologic/Lymphatic/Immunologic: no anemia, abnormal bleeding/bruising, fever, chills, night sweats, swollen glands, or unexplained weight loss  Endocrine: no heat or cold intolerance and no polyphagia, polydipsia, or polyuria        OBJECTIVE:     VS:  Wt Readings from Last 3 Encounters:   02/28/23 210 lb (95.3 kg)   11/15/22 213 lb (96.6 kg)   05/02/22 201 lb (91.2 kg)     Temp Readings from Last 3 Encounters:   11/15/22 97 °F (36.1 °C)   05/02/22 97.3 °F (36.3 °C)   11/11/21 98 °F (36.7 °C)     BP Readings from Last 3 Encounters:   02/28/23 118/78   11/15/22 110/70   05/02/22 110/78        General appearance: Alert, Awake, Oriented times 3, no distress  Skin: Warm and dry  Head: Normocephalic. No masses, lesions or tenderness noted  Eyes: Conjunctivae appear normal. PERLE  Ears: External ears normal  Nose/Sinuses: Nares normal. Septum midline.  Mucosa normal. No drainage  Oropharynx: Oropharynx clear with no exudate seen  Neck: Neck supple. No jugular venous distension, lymphadenopathy or thyromegaly Trachea midline  Chest:  Normal. Movements are Normal and Equal.  Lungs: Lungs clear to auscultation bilaterally. No ronchi, crackles or wheezes  Heart: S1 S2  Regular rate and rhythm. No rub, murmur or gallop  Abdomen: Abdomen soft, non-tender. BS normal. No masses, organomegaly. Back: Grossly Normal and Equal. DTR are Normal. SLR is Normal.  Extremities: Arthritic changes are noted. Movements are limited. Pedal pulses are normal.  Musculoskeletal: Muscular strength appears intact. No joint effusion, tenderness, swelling or warmth  Neuro:  No focal motor or sensory deficits        ASSESSMENT     Patient Active Problem List    Diagnosis Date Noted    Lumbar strain 11/12/2013    Obesity (BMI 30.0-34.9) 11/12/2013    Primary hypercholesterolemia 06/13/2013    Mild chronic obstructive pulmonary disease ([facility] [address] 06/10/2021    H/O: gout 06/10/2021    Pulmonary embolism ([facility] [address] 06/10/2021    Allergic rhinitis due to allergen 02/11/2020        Diagnosis:     ICD-10-CM    1. Primary hypercholesterolemia  E78.00 Comprehensive Metabolic Panel     Lipid Panel    CONTROLLED      2. Hypovitaminosis D  E55.9     IMPROVED      3. Saddle embolus of pulmonary artery without acute cor pulmonale, unspecified chronicity (HCC)  I26.92     STABLE      4. Mild chronic obstructive pulmonary disease (HCC)  J44.9     STABLE      5. Obesity (BMI 30.0-34. 9)  E66.9     STABLE      6. Screening mammogram for breast cancer  Z12.31 DAMON DIGITAL SCREEN W OR WO CAD BILATERAL      7. Elevated liver enzymes  R74.8     BORDERLINE          PLAN:           Patient Instructions   LOW CARBOHYDRATE FOR  WEIGHT CONTROL. LOW FAT DIET FOR CHOLESTEROL CONTROL. DECREASE CRESTOR TO 5  MG. NIGHTLY FOR CHOLESTEROL CONTROL. TAKE ELIQUIS 5 MG. 2 TIMES A DAY  TAKE  VITAMIN D-3 2000 UNITS DAILY FOR IMPROVING DEFICIENCY. REGULAR WALKING ADVISED.   ADVISED WEIGHT REDUCTION. MAMMOGRAM AS SCHEDULED. FASTING FOR LAB WORK PRIOR TO NEXT VISIT   NEXT APPOINTMENT IN 2 MONTHS. Return in about 2 months (around 4/28/2023) for FOLLOW UP VISIT. I have reviewed my findings and recommendations with Dayana Eagle.     Electronically signed by Donita Loco MD on 2/28/23 at 2:18 PM EST

## 2023-02-28 NOTE — PATIENT INSTRUCTIONS
LOW CARBOHYDRATE FOR  WEIGHT CONTROL. LOW FAT DIET FOR CHOLESTEROL CONTROL. DECREASE CRESTOR TO 5  MG. NIGHTLY FOR CHOLESTEROL CONTROL. TAKE ELIQUIS 5 MG. 2 TIMES A DAY  TAKE  VITAMIN D-3 2000 UNITS DAILY FOR IMPROVING DEFICIENCY. REGULAR WALKING ADVISED. ADVISED WEIGHT REDUCTION. MAMMOGRAM AS SCHEDULED. FASTING FOR LAB WORK PRIOR TO NEXT VISIT   NEXT APPOINTMENT IN 2 MONTHS.

## 2023-04-17 ENCOUNTER — HOSPITAL ENCOUNTER (OUTPATIENT)
Dept: GENERAL RADIOLOGY | Age: 63
Discharge: HOME | End: 2023-04-19
Payer: COMMERCIAL

## 2023-04-17 DIAGNOSIS — Z12.31 SCREENING MAMMOGRAM FOR BREAST CANCER: ICD-10-CM

## 2023-04-17 PROCEDURE — 77063 BREAST TOMOSYNTHESIS BI: CPT

## 2023-04-18 NOTE — RESULT ENCOUNTER NOTE
IMPRESSION:  No mammographic evidence of malignancy in either breast.     RECOMMENDATION:     Bilateral mammogram is advised in 1 year.

## 2023-04-27 DIAGNOSIS — E78.00 PRIMARY HYPERCHOLESTEROLEMIA: ICD-10-CM

## 2023-04-27 LAB
ALBUMIN SERPL-MCNC: 4.5 G/DL (ref 3.5–5.2)
ALP SERPL-CCNC: 104 U/L (ref 35–104)
ALT SERPL-CCNC: 16 U/L (ref 0–32)
ANION GAP SERPL CALCULATED.3IONS-SCNC: 15 MMOL/L (ref 7–16)
AST SERPL-CCNC: 25 U/L (ref 0–31)
BILIRUB SERPL-MCNC: 0.5 MG/DL (ref 0–1.2)
BUN SERPL-MCNC: 17 MG/DL (ref 6–23)
CALCIUM SERPL-MCNC: 10.3 MG/DL (ref 8.6–10.2)
CHLORIDE SERPL-SCNC: 106 MMOL/L (ref 98–107)
CHOLESTEROL, TOTAL: 152 MG/DL (ref 0–199)
CO2 SERPL-SCNC: 22 MMOL/L (ref 22–29)
CREAT SERPL-MCNC: 0.9 MG/DL (ref 0.5–1)
GLUCOSE SERPL-MCNC: 95 MG/DL (ref 74–99)
HDLC SERPL-MCNC: 55 MG/DL
LDLC SERPL CALC-MCNC: 79 MG/DL (ref 0–99)
POTASSIUM SERPL-SCNC: 4.4 MMOL/L (ref 3.5–5)
PROT SERPL-MCNC: 7.4 G/DL (ref 6.4–8.3)
SODIUM SERPL-SCNC: 143 MMOL/L (ref 132–146)
TRIGL SERPL-MCNC: 92 MG/DL (ref 0–149)
VLDLC SERPL CALC-MCNC: 18 MG/DL

## 2023-05-04 ENCOUNTER — OFFICE VISIT (OUTPATIENT)
Dept: FAMILY MEDICINE CLINIC | Age: 63
End: 2023-05-04
Payer: COMMERCIAL

## 2023-05-04 VITALS
DIASTOLIC BLOOD PRESSURE: 80 MMHG | HEART RATE: 92 BPM | BODY MASS INDEX: 34.11 KG/M2 | WEIGHT: 205 LBS | SYSTOLIC BLOOD PRESSURE: 118 MMHG | OXYGEN SATURATION: 99 %

## 2023-05-04 DIAGNOSIS — E78.00 PRIMARY HYPERCHOLESTEROLEMIA: Primary | ICD-10-CM

## 2023-05-04 DIAGNOSIS — J44.9 MILD CHRONIC OBSTRUCTIVE PULMONARY DISEASE (HCC): ICD-10-CM

## 2023-05-04 DIAGNOSIS — D68.59 THROMBOPHILIA (HCC): ICD-10-CM

## 2023-05-04 DIAGNOSIS — E55.9 HYPOVITAMINOSIS D: ICD-10-CM

## 2023-05-04 DIAGNOSIS — I26.92 SADDLE EMBOLUS OF PULMONARY ARTERY WITHOUT ACUTE COR PULMONALE, UNSPECIFIED CHRONICITY (HCC): ICD-10-CM

## 2023-05-04 DIAGNOSIS — E66.9 OBESITY (BMI 30.0-34.9): ICD-10-CM

## 2023-05-04 PROCEDURE — 99214 OFFICE O/P EST MOD 30 MIN: CPT | Performed by: FAMILY MEDICINE

## 2023-05-04 NOTE — PATIENT INSTRUCTIONS
LOW CARBOHYDRATE FOR  WEIGHT CONTROL. LOW FAT DIET FOR CHOLESTEROL CONTROL. TAKE CRESTOR 10 MG. NIGHTLY FOR CHOLESTEROL CONTROL. TAKE ELIQUIS 5 MG. 2 TIMES A DAY FOR RECURRENT PULMONARY EMBOLISM. TAKE  VITAMIN D-3 1000 UNITS DAILY  FOR MAINTENANCES. REGULAR WALKING ADVISED. ADVISED WEIGHT REDUCTION. NEXT APPOINTMENT IN 3 MONTHS.

## 2023-05-04 NOTE — PROGRESS NOTES
OFFICE PROGRESS NOTE      SUBJECTIVE:        Patient ID:   Priscilla Savage is a 57 y.o. female who presents for   Chief Complaint   Patient presents with    Pulmonary Embolism           HPI:     RECHECK PULMONARY EMBOLISM, CHOLESTEROL AND HYPOVITAMINOSIS D.  MEDICATION REFILL. FEELS GOOD. WATCHING DIET GOOD. WALKING SOME FOR EXERCISE. TAKING MEDICATIONS REGULARLY. Prior to Admission medications    Medication Sig Start Date End Date Taking?  Authorizing Provider   apixaban (ELIQUIS) 5 MG TABS tablet Take 1 tablet by mouth 2 times daily 2/28/23 8/27/23 Yes Trinidad Prajapati MD   rosuvastatin (CRESTOR) 5 MG tablet Take 1 tablet by mouth daily 2/28/23  Yes Trinidad Prajapati MD   Multiple Vitamins-Minerals (THERAPEUTIC MULTIVITAMIN-MINERALS) tablet Take 1 tablet by mouth daily   Yes Historical Provider, MD   albuterol sulfate  (90 Base) MCG/ACT inhaler Inhale 1 puff into the lungs every 4 hours as needed for Wheezing 6/10/21  Yes Trinidad Prajapati MD     Social History     Socioeconomic History    Marital status: Single   Tobacco Use    Smoking status: Never    Smokeless tobacco: Never   Vaping Use    Vaping Use: Never used   Substance and Sexual Activity    Alcohol use: No    Drug use: No     Social Determinants of Health     Financial Resource Strain: Low Risk     Difficulty of Paying Living Expenses: Not hard at all   Food Insecurity: No Food Insecurity    Worried About Running Out of Food in the Last Year: Never true    Ran Out of Food in the Last Year: Never true   Transportation Needs: Unknown    Lack of Transportation (Non-Medical): No   Housing Stability: Unknown    Unstable Housing in the Last Year: No       I have reviewed Leahs allergies, medications, problem list, medical, social and family history and have updated as needed in the electronic medical record  Review Of Systems:    Skin: no abnormal pigmentation, rash, scaling, itching, masses, hair or nail

## 2023-05-09 RX ORDER — ROSUVASTATIN CALCIUM 5 MG/1
TABLET, COATED ORAL
Qty: 90 TABLET | Refills: 1 | Status: SHIPPED | OUTPATIENT
Start: 2023-05-09

## 2023-08-02 ENCOUNTER — OFFICE VISIT (OUTPATIENT)
Dept: FAMILY MEDICINE CLINIC | Age: 63
End: 2023-08-02
Payer: MEDICARE

## 2023-08-02 VITALS
HEART RATE: 100 BPM | BODY MASS INDEX: 34.16 KG/M2 | WEIGHT: 205 LBS | DIASTOLIC BLOOD PRESSURE: 78 MMHG | HEIGHT: 65 IN | SYSTOLIC BLOOD PRESSURE: 122 MMHG | OXYGEN SATURATION: 95 % | TEMPERATURE: 97 F

## 2023-08-02 DIAGNOSIS — D68.59 THROMBOPHILIA (HCC): ICD-10-CM

## 2023-08-02 DIAGNOSIS — L23.7 CONTACT DERMATITIS DUE TO POISON IVY: Primary | ICD-10-CM

## 2023-08-02 DIAGNOSIS — E78.00 PRIMARY HYPERCHOLESTEROLEMIA: ICD-10-CM

## 2023-08-02 PROCEDURE — 1036F TOBACCO NON-USER: CPT | Performed by: FAMILY MEDICINE

## 2023-08-02 PROCEDURE — G8427 DOCREV CUR MEDS BY ELIG CLIN: HCPCS | Performed by: FAMILY MEDICINE

## 2023-08-02 PROCEDURE — G8400 PT W/DXA NO RESULTS DOC: HCPCS | Performed by: FAMILY MEDICINE

## 2023-08-02 PROCEDURE — 3017F COLORECTAL CA SCREEN DOC REV: CPT | Performed by: FAMILY MEDICINE

## 2023-08-02 PROCEDURE — 1090F PRES/ABSN URINE INCON ASSESS: CPT | Performed by: FAMILY MEDICINE

## 2023-08-02 PROCEDURE — G8417 CALC BMI ABV UP PARAM F/U: HCPCS | Performed by: FAMILY MEDICINE

## 2023-08-02 PROCEDURE — 99214 OFFICE O/P EST MOD 30 MIN: CPT | Performed by: FAMILY MEDICINE

## 2023-08-02 PROCEDURE — 1123F ACP DISCUSS/DSCN MKR DOCD: CPT | Performed by: FAMILY MEDICINE

## 2023-08-02 RX ORDER — TRIAMCINOLONE ACETONIDE 40 MG/ML
40 INJECTION, SUSPENSION INTRA-ARTICULAR; INTRAMUSCULAR ONCE
Status: COMPLETED | OUTPATIENT
Start: 2023-08-02 | End: 2023-08-02

## 2023-08-02 RX ADMIN — TRIAMCINOLONE ACETONIDE 40 MG: 40 INJECTION, SUSPENSION INTRA-ARTICULAR; INTRAMUSCULAR at 16:55

## 2023-08-02 NOTE — PROGRESS NOTES
OFFICE PROGRESS NOTE      SUBJECTIVE:        Patient ID:   Virginia Sanchez is a 70 y.o. female who presents for   Chief Complaint   Patient presents with    Poison Ivy     Arms and eyes           HPI:     RECHECK CHOLESTEROL AND PE.  EXPOSED TO POISON IVY 2 DAYS AGO. STARTED HAVING ITCHY RASH ON BOTH THE ARMS AND LEFT EYE LIDS. WANTS TO HAVE CORTISONE INJECTION AS THE LESIONS ARE VERY ITCHY. MEDICATION REFILL. FEELS GOOD. WATCHING DIET GOOD. WALKING FOR EXERCISE. TAKING MEDICATIONS REGULARLY. Prior to Admission medications    Medication Sig Start Date End Date Taking?  Authorizing Provider   rosuvastatin (CRESTOR) 5 MG tablet TAKE ONE TABLET BY MOUTH ONE TIME A DAY 5/9/23  Yes Alba Graham MD   apixaban (ELIQUIS) 5 MG TABS tablet Take 1 tablet by mouth 2 times daily 2/28/23 8/27/23 Yes Alba Graham MD   Multiple Vitamins-Minerals (THERAPEUTIC MULTIVITAMIN-MINERALS) tablet Take 1 tablet by mouth daily   Yes Historical Provider, MD     Social History     Socioeconomic History    Marital status: Single     Spouse name: None    Number of children: None    Years of education: None    Highest education level: None   Tobacco Use    Smoking status: Never    Smokeless tobacco: Never   Vaping Use    Vaping Use: Never used   Substance and Sexual Activity    Alcohol use: No    Drug use: No     Social Determinants of Health     Financial Resource Strain: Low Risk     Difficulty of Paying Living Expenses: Not hard at all   Food Insecurity: No Food Insecurity    Worried About Running Out of Food in the Last Year: Never true    Ran Out of Food in the Last Year: Never true   Transportation Needs: Unknown    Lack of Transportation (Non-Medical): No   Housing Stability: Unknown    Unstable Housing in the Last Year: No       I have reviewed Viviana's allergies, medications, problem list, medical, social and family history and have updated as needed in the electronic medical

## 2023-08-02 NOTE — PATIENT INSTRUCTIONS
LOW CARBOHYDRATE FOR  WEIGHT CONTROL. LOW FAT DIET FOR CHOLESTEROL CONTROL. TAKE CRESTOR 10 MG. NIGHTLY FOR CHOLESTEROL CONTROL. TAKE ELIQUIS 5 MG. 2 TIMES A DAY FOR RECURRENT PULMONARY EMBOLISM. TAKE  VITAMIN D-3 1000 UNITS DAILY  FOR MAINTENANCES. KENALOG INJECTION 40 MG. /ML GIVEN IM. APPLY CORTISONE-10 OVER THE RASH ON ARMS AND LEFT EYE LIDS. REGULAR WALKING ADVISED. ADVISED WEIGHT REDUCTION. KEEP NEXT APPOINTMENT IN 3 WEEKS.

## 2023-08-24 ENCOUNTER — OFFICE VISIT (OUTPATIENT)
Dept: FAMILY MEDICINE CLINIC | Age: 63
End: 2023-08-24
Payer: MEDICARE

## 2023-08-24 VITALS
BODY MASS INDEX: 33.99 KG/M2 | OXYGEN SATURATION: 98 % | HEIGHT: 65 IN | WEIGHT: 204 LBS | SYSTOLIC BLOOD PRESSURE: 120 MMHG | DIASTOLIC BLOOD PRESSURE: 74 MMHG | HEART RATE: 80 BPM

## 2023-08-24 DIAGNOSIS — D68.59 THROMBOPHILIA (HCC): ICD-10-CM

## 2023-08-24 DIAGNOSIS — J44.9 MILD CHRONIC OBSTRUCTIVE PULMONARY DISEASE (HCC): ICD-10-CM

## 2023-08-24 DIAGNOSIS — Z00.00 WELCOME TO MEDICARE PREVENTIVE VISIT: Primary | ICD-10-CM

## 2023-08-24 DIAGNOSIS — I26.92 SADDLE EMBOLUS OF PULMONARY ARTERY WITHOUT ACUTE COR PULMONALE, UNSPECIFIED CHRONICITY (HCC): ICD-10-CM

## 2023-08-24 DIAGNOSIS — E78.00 PRIMARY HYPERCHOLESTEROLEMIA: ICD-10-CM

## 2023-08-24 DIAGNOSIS — E66.9 OBESITY (BMI 30.0-34.9): ICD-10-CM

## 2023-08-24 PROCEDURE — G0402 INITIAL PREVENTIVE EXAM: HCPCS | Performed by: FAMILY MEDICINE

## 2023-08-24 PROCEDURE — 3017F COLORECTAL CA SCREEN DOC REV: CPT | Performed by: FAMILY MEDICINE

## 2023-08-24 PROCEDURE — 1123F ACP DISCUSS/DSCN MKR DOCD: CPT | Performed by: FAMILY MEDICINE

## 2023-08-24 ASSESSMENT — PATIENT HEALTH QUESTIONNAIRE - PHQ9
1. LITTLE INTEREST OR PLEASURE IN DOING THINGS: 0
SUM OF ALL RESPONSES TO PHQ QUESTIONS 1-9: 0
2. FEELING DOWN, DEPRESSED OR HOPELESS: 0
SUM OF ALL RESPONSES TO PHQ QUESTIONS 1-9: 0
SUM OF ALL RESPONSES TO PHQ QUESTIONS 1-9: 0
SUM OF ALL RESPONSES TO PHQ9 QUESTIONS 1 & 2: 0
SUM OF ALL RESPONSES TO PHQ QUESTIONS 1-9: 0

## 2023-08-24 ASSESSMENT — VISUAL ACUITY
OS_CC: 20/20
OD_CC: 20/20

## 2023-08-24 ASSESSMENT — LIFESTYLE VARIABLES
HOW MANY STANDARD DRINKS CONTAINING ALCOHOL DO YOU HAVE ON A TYPICAL DAY: PATIENT DOES NOT DRINK
HOW OFTEN DO YOU HAVE A DRINK CONTAINING ALCOHOL: NEVER

## 2023-08-24 NOTE — PATIENT INSTRUCTIONS
LOW CARBOHYDRATE FOR  WEIGHT CONTROL. LOW FAT DIET FOR CHOLESTEROL CONTROL. TAKE CRESTOR 10 MG. NIGHTLY FOR CHOLESTEROL CONTROL. TAKE ELIQUIS 5 MG. 2 TIMES A DAY FOR RECURRENT PULMONARY EMBOLISM. TAKE  VITAMIN D-3 2000 UNITS DAILY  FOR MAINTENANCES. REGULAR WALKING ADVISED. ADVISED WEIGHT REDUCTION. KEEP NEXT APPOINTMENT IN 3 MONTHS. Starting a Weight Loss Plan: Care Instructions  Overview     If you're thinking about losing weight, it can be hard to know where to start. Your doctor can help you set up a weight loss plan that best meets your needs. You may want to take a class on nutrition or exercise, or you could join a weight loss support group. If you have questions about how to make changes to your eating or exercise habits, ask your doctor about seeing a registered dietitian or an exercise specialist.  It can be a big challenge to lose weight. But you don't have to make huge changes at once. Make small changes, and stick with them. When those changes become habit, add a few more changes. If you don't think you're ready to make changes right now, try to pick a date in the future. Make an appointment to see your doctor to discuss whether the time is right for you to start a plan. Follow-up care is a key part of your treatment and safety. Be sure to make and go to all appointments, and call your doctor if you are having problems. It's also a good idea to know your test results and keep a list of the medicines you take. How can you care for yourself at home? Set realistic goals. Many people expect to lose much more weight than is likely. A weight loss of 5% to 10% of your body weight may be enough to improve your health. Get family and friends involved to provide support. Talk to them about why you are trying to lose weight, and ask them to help. They can help by participating in exercise and having meals with you, even if they may be eating something different. Find what works best for you.  If

## 2023-10-17 RX ORDER — ROSUVASTATIN CALCIUM 5 MG/1
5 TABLET, COATED ORAL DAILY
Qty: 90 TABLET | Refills: 1 | Status: SHIPPED | OUTPATIENT
Start: 2023-10-17

## 2023-11-07 ENCOUNTER — OFFICE VISIT (OUTPATIENT)
Dept: FAMILY MEDICINE CLINIC | Age: 63
End: 2023-11-07
Payer: MEDICARE

## 2023-11-07 VITALS
WEIGHT: 213 LBS | BODY MASS INDEX: 35.45 KG/M2 | SYSTOLIC BLOOD PRESSURE: 118 MMHG | DIASTOLIC BLOOD PRESSURE: 78 MMHG | HEART RATE: 88 BPM | OXYGEN SATURATION: 98 %

## 2023-11-07 DIAGNOSIS — E66.01 SEVERE OBESITY (BMI 35.0-39.9) WITH COMORBIDITY (HCC): ICD-10-CM

## 2023-11-07 DIAGNOSIS — I26.92 SADDLE EMBOLUS OF PULMONARY ARTERY WITHOUT ACUTE COR PULMONALE, UNSPECIFIED CHRONICITY (HCC): ICD-10-CM

## 2023-11-07 DIAGNOSIS — J44.9 MILD CHRONIC OBSTRUCTIVE PULMONARY DISEASE (HCC): ICD-10-CM

## 2023-11-07 DIAGNOSIS — E55.9 HYPOVITAMINOSIS D: ICD-10-CM

## 2023-11-07 DIAGNOSIS — E78.00 PRIMARY HYPERCHOLESTEROLEMIA: Primary | ICD-10-CM

## 2023-11-07 DIAGNOSIS — Z23 FLU VACCINE NEED: ICD-10-CM

## 2023-11-07 PROCEDURE — 3017F COLORECTAL CA SCREEN DOC REV: CPT | Performed by: FAMILY MEDICINE

## 2023-11-07 PROCEDURE — 90694 VACC AIIV4 NO PRSRV 0.5ML IM: CPT | Performed by: FAMILY MEDICINE

## 2023-11-07 PROCEDURE — G8484 FLU IMMUNIZE NO ADMIN: HCPCS | Performed by: FAMILY MEDICINE

## 2023-11-07 PROCEDURE — G0008 ADMIN INFLUENZA VIRUS VAC: HCPCS | Performed by: FAMILY MEDICINE

## 2023-11-07 PROCEDURE — 1123F ACP DISCUSS/DSCN MKR DOCD: CPT | Performed by: FAMILY MEDICINE

## 2023-11-07 PROCEDURE — G8400 PT W/DXA NO RESULTS DOC: HCPCS | Performed by: FAMILY MEDICINE

## 2023-11-07 PROCEDURE — G8417 CALC BMI ABV UP PARAM F/U: HCPCS | Performed by: FAMILY MEDICINE

## 2023-11-07 PROCEDURE — 1036F TOBACCO NON-USER: CPT | Performed by: FAMILY MEDICINE

## 2023-11-07 PROCEDURE — 99214 OFFICE O/P EST MOD 30 MIN: CPT | Performed by: FAMILY MEDICINE

## 2023-11-07 PROCEDURE — 3023F SPIROM DOC REV: CPT | Performed by: FAMILY MEDICINE

## 2023-11-07 PROCEDURE — 1090F PRES/ABSN URINE INCON ASSESS: CPT | Performed by: FAMILY MEDICINE

## 2023-11-07 PROCEDURE — G8427 DOCREV CUR MEDS BY ELIG CLIN: HCPCS | Performed by: FAMILY MEDICINE

## 2024-03-11 DIAGNOSIS — E78.00 PRIMARY HYPERCHOLESTEROLEMIA: ICD-10-CM

## 2024-03-11 DIAGNOSIS — E55.9 HYPOVITAMINOSIS D: ICD-10-CM

## 2024-03-12 LAB
ALBUMIN SERPL-MCNC: 4.3 G/DL (ref 3.5–5.2)
ALP BLD-CCNC: 105 U/L (ref 35–104)
ALT SERPL-CCNC: 55 U/L (ref 0–32)
ANION GAP SERPL CALCULATED.3IONS-SCNC: 12 MMOL/L (ref 7–16)
AST SERPL-CCNC: 28 U/L (ref 0–31)
BILIRUB SERPL-MCNC: 0.4 MG/DL (ref 0–1.2)
BUN BLDV-MCNC: 15 MG/DL (ref 6–23)
CALCIUM SERPL-MCNC: 9.6 MG/DL (ref 8.6–10.2)
CHLORIDE BLD-SCNC: 103 MMOL/L (ref 98–107)
CHOLESTEROL: 122 MG/DL
CO2: 22 MMOL/L (ref 22–29)
CREAT SERPL-MCNC: 0.8 MG/DL (ref 0.5–1)
GFR SERPL CREATININE-BSD FRML MDRD: >60 ML/MIN/1.73M2
GLUCOSE BLD-MCNC: 90 MG/DL (ref 74–99)
HDLC SERPL-MCNC: 49 MG/DL
LDL CHOLESTEROL: 57 MG/DL
POTASSIUM SERPL-SCNC: 4.7 MMOL/L (ref 3.5–5)
SODIUM BLD-SCNC: 137 MMOL/L (ref 132–146)
TOTAL PROTEIN: 7 G/DL (ref 6.4–8.3)
TRIGL SERPL-MCNC: 79 MG/DL
VITAMIN D 25-HYDROXY: 27 NG/ML (ref 30–100)
VLDLC SERPL CALC-MCNC: 16 MG/DL

## 2024-03-12 NOTE — RESULT ENCOUNTER NOTE
VITAMIN D LEVEL LOW. TAKE VITAMIN D-3 2000 UNITS DAILY.   DISCUSS NEXT VISIT.   PLEASE ACKNOWLEDGE RECEIPT OF INFORMATION BY REPLYING THE MESSAGE. THANKS.

## 2024-03-14 ENCOUNTER — OFFICE VISIT (OUTPATIENT)
Dept: FAMILY MEDICINE CLINIC | Age: 64
End: 2024-03-14

## 2024-03-14 VITALS
WEIGHT: 211 LBS | DIASTOLIC BLOOD PRESSURE: 80 MMHG | OXYGEN SATURATION: 98 % | BODY MASS INDEX: 35.11 KG/M2 | HEART RATE: 77 BPM | SYSTOLIC BLOOD PRESSURE: 122 MMHG

## 2024-03-14 DIAGNOSIS — I26.92 SADDLE EMBOLUS OF PULMONARY ARTERY WITHOUT ACUTE COR PULMONALE, UNSPECIFIED CHRONICITY (HCC): ICD-10-CM

## 2024-03-14 DIAGNOSIS — E66.01 SEVERE OBESITY (BMI 35.0-39.9) WITH COMORBIDITY (HCC): ICD-10-CM

## 2024-03-14 DIAGNOSIS — E78.00 PRIMARY HYPERCHOLESTEROLEMIA: Primary | ICD-10-CM

## 2024-03-14 DIAGNOSIS — J44.9 MILD CHRONIC OBSTRUCTIVE PULMONARY DISEASE (HCC): ICD-10-CM

## 2024-03-14 DIAGNOSIS — E55.9 HYPOVITAMINOSIS D: ICD-10-CM

## 2024-03-14 RX ORDER — ROSUVASTATIN CALCIUM 5 MG/1
5 TABLET, COATED ORAL DAILY
Qty: 90 TABLET | Refills: 1 | Status: SHIPPED | OUTPATIENT
Start: 2024-03-14

## 2024-03-14 RX ORDER — ERGOCALCIFEROL 1.25 MG/1
50000 CAPSULE ORAL WEEKLY
Qty: 12 CAPSULE | Refills: 1 | Status: SHIPPED
Start: 2024-03-14 | End: 2024-03-14 | Stop reason: CLARIF

## 2024-03-14 SDOH — ECONOMIC STABILITY: FOOD INSECURITY: WITHIN THE PAST 12 MONTHS, YOU WORRIED THAT YOUR FOOD WOULD RUN OUT BEFORE YOU GOT MONEY TO BUY MORE.: NEVER TRUE

## 2024-03-14 SDOH — ECONOMIC STABILITY: INCOME INSECURITY: HOW HARD IS IT FOR YOU TO PAY FOR THE VERY BASICS LIKE FOOD, HOUSING, MEDICAL CARE, AND HEATING?: NOT HARD AT ALL

## 2024-03-14 SDOH — ECONOMIC STABILITY: FOOD INSECURITY: WITHIN THE PAST 12 MONTHS, THE FOOD YOU BOUGHT JUST DIDN'T LAST AND YOU DIDN'T HAVE MONEY TO GET MORE.: NEVER TRUE

## 2024-03-14 ASSESSMENT — PATIENT HEALTH QUESTIONNAIRE - PHQ9
2. FEELING DOWN, DEPRESSED OR HOPELESS: 0
SUM OF ALL RESPONSES TO PHQ QUESTIONS 1-9: 0
SUM OF ALL RESPONSES TO PHQ9 QUESTIONS 1 & 2: 0
1. LITTLE INTEREST OR PLEASURE IN DOING THINGS: 0
SUM OF ALL RESPONSES TO PHQ QUESTIONS 1-9: 0

## 2024-03-14 NOTE — PATIENT INSTRUCTIONS
LOW CARBOHYDRATE FOR  WEIGHT CONTROL.  LOW FAT DIET FOR CHOLESTEROL CONTROL.  TAKE CRESTOR 5 MG. NIGHTLY 3 TIMES WEEKLY FOR CHOLESTEROL CONTROL.  TAKE ELIQUIS 5 MG. 2 TIMES A DAY FOR RECURRENT PULMONARY EMBOLISM.  TAKE  VITAMIN D-3 5000 UNITS X 3 WEEKLY  FOR MAINTENANCES.  REGULAR WALKING ADVISED.  ADVISED WEIGHT REDUCTION.  FASTING FOR LAB WORK PRIOR TO NEXT VISIT.  KEEP NEXT APPOINTMENT IN 2 MONTHS.

## 2024-03-14 NOTE — PROGRESS NOTES
OFFICE PROGRESS NOTE      SUBJECTIVE:        Patient ID:   Viviana Santana is a 63 y.o. female who presents for   Chief Complaint   Patient presents with    Cholesterol Problem           HPI:     RECHECK BP, CHOLESTEROL AND COPD.  CONCERNED ABOUT ELEVATED LIVER ENZYME.WOULD LIKE TO TRY TAKING CRESTOR 3 TIMES A WEEK.  TAKING VITAMIN D 5000 2 TIME A WEEK. AGREES TO TAKE 3 TIMES A WEEK.  MEDICATION REFILL.  FEELS GOOD.   WATCHING DIET BUT NOT GOOD.  WALKING FOR EXERCISE.  TAKING MEDICATIONS REGULARLY.         Prior to Admission medications    Medication Sig Start Date End Date Taking? Authorizing Provider   apixaban (ELIQUIS) 5 MG TABS tablet Take 1 tablet by mouth 2 times daily 3/14/24 9/10/24 Yes Christiano Hearn MD   rosuvastatin (CRESTOR) 5 MG tablet Take 1 tablet by mouth daily 3/14/24  Yes Christiano Hearn MD   vitamin D (ERGOCALCIFEROL) 1.25 MG (18374 UT) CAPS capsule Take 1 capsule by mouth once a week 3/14/24  Yes Christiano Hearn MD   Multiple Vitamins-Minerals (THERAPEUTIC MULTIVITAMIN-MINERALS) tablet Take 1 tablet by mouth daily   Yes ProviderAntoine MD     Social History     Socioeconomic History    Marital status: Single     Spouse name: None    Number of children: None    Years of education: None    Highest education level: None   Tobacco Use    Smoking status: Never    Smokeless tobacco: Never   Vaping Use    Vaping Use: Never used   Substance and Sexual Activity    Alcohol use: No    Drug use: No    Sexual activity: Yes     Partners: Male     Social Determinants of Health     Financial Resource Strain: Low Risk  (3/14/2024)    Overall Financial Resource Strain (CARDIA)     Difficulty of Paying Living Expenses: Not hard at all   Food Insecurity: No Food Insecurity (3/14/2024)    Hunger Vital Sign     Worried About Running Out of Food in the Last Year: Never true     Ran Out of Food in the Last Year: Never true   Transportation Needs: Unknown (3/14/2024)

## 2024-06-19 DIAGNOSIS — E78.00 PRIMARY HYPERCHOLESTEROLEMIA: ICD-10-CM

## 2024-06-19 DIAGNOSIS — E55.9 HYPOVITAMINOSIS D: ICD-10-CM

## 2024-06-19 LAB
ALBUMIN: 4.3 G/DL (ref 3.5–5.2)
ALP BLD-CCNC: 113 U/L (ref 35–104)
ALT SERPL-CCNC: 26 U/L (ref 0–32)
ANION GAP SERPL CALCULATED.3IONS-SCNC: 14 MMOL/L (ref 7–16)
AST SERPL-CCNC: 23 U/L (ref 0–31)
BILIRUB SERPL-MCNC: 0.4 MG/DL (ref 0–1.2)
BUN BLDV-MCNC: 18 MG/DL (ref 6–23)
CALCIUM SERPL-MCNC: 9.8 MG/DL (ref 8.6–10.2)
CHLORIDE BLD-SCNC: 107 MMOL/L (ref 98–107)
CHOLESTEROL, TOTAL: 204 MG/DL
CO2: 21 MMOL/L (ref 22–29)
CREAT SERPL-MCNC: 0.9 MG/DL (ref 0.5–1)
GFR, ESTIMATED: 76 ML/MIN/1.73M2
GLUCOSE BLD-MCNC: 92 MG/DL (ref 74–99)
HDLC SERPL-MCNC: 66 MG/DL
LDL CHOLESTEROL: 110 MG/DL
POTASSIUM SERPL-SCNC: 4.7 MMOL/L (ref 3.5–5)
SODIUM BLD-SCNC: 142 MMOL/L (ref 132–146)
TOTAL PROTEIN: 7.2 G/DL (ref 6.4–8.3)
TRIGL SERPL-MCNC: 141 MG/DL
VITAMIN D 25-HYDROXY: 30.4 NG/ML (ref 30–100)
VLDLC SERPL CALC-MCNC: 28 MG/DL

## 2024-06-20 NOTE — RESULT ENCOUNTER NOTE
LDL CHOLESTEROL LEVEL ARE HIGH.  RECOMMEND:  LOW SALT, LOW CARB. AND LOW FAT DIET.  REGULAR EXERCISE.  CONTINUE CURRENT MEDICATIONS.   DISCUSS NEXT VISIT.   PLEASE ACKNOWLEDGE RECEIPT OF INFORMATION BY REPLYING THE MESSAGE. THANKS.

## 2024-06-26 ENCOUNTER — OFFICE VISIT (OUTPATIENT)
Dept: FAMILY MEDICINE CLINIC | Age: 64
End: 2024-06-26

## 2024-06-26 ENCOUNTER — TELEPHONE (OUTPATIENT)
Dept: FAMILY MEDICINE CLINIC | Age: 64
End: 2024-06-26

## 2024-06-26 VITALS
SYSTOLIC BLOOD PRESSURE: 124 MMHG | WEIGHT: 205 LBS | OXYGEN SATURATION: 99 % | HEART RATE: 94 BPM | DIASTOLIC BLOOD PRESSURE: 82 MMHG | BODY MASS INDEX: 34.11 KG/M2

## 2024-06-26 DIAGNOSIS — E78.00 PRIMARY HYPERCHOLESTEROLEMIA: Primary | ICD-10-CM

## 2024-06-26 DIAGNOSIS — Z12.31 SCREENING MAMMOGRAM FOR BREAST CANCER: ICD-10-CM

## 2024-06-26 DIAGNOSIS — E66.9 OBESITY (BMI 30.0-34.9): ICD-10-CM

## 2024-06-26 DIAGNOSIS — E55.9 HYPOVITAMINOSIS D: ICD-10-CM

## 2024-06-26 DIAGNOSIS — J44.9 MILD CHRONIC OBSTRUCTIVE PULMONARY DISEASE (HCC): ICD-10-CM

## 2024-06-26 DIAGNOSIS — I26.92 SADDLE EMBOLUS OF PULMONARY ARTERY WITHOUT ACUTE COR PULMONALE, UNSPECIFIED CHRONICITY (HCC): ICD-10-CM

## 2024-06-26 PROBLEM — D68.59 THROMBOPHILIA (HCC): Status: RESOLVED | Noted: 2023-05-04 | Resolved: 2024-06-26

## 2024-06-26 RX ORDER — ACETAMINOPHEN 160 MG
1 TABLET,DISINTEGRATING ORAL DAILY
COMMUNITY

## 2024-06-26 NOTE — PROGRESS NOTES
OFFICE PROGRESS NOTE      SUBJECTIVE:        Patient ID:   Viviana Santana is a 63 y.o. female who presents for   Chief Complaint   Patient presents with    COPD    Cholesterol Problem           HPI:     RECHECK BP, CHOLESTEROL AND COPD.  TAKING CRESTOR 3 TIMES A WEEK BECAUSE OF ELEVATED LIVER ENZYMES.  MEDICATION REFILL.  FEELS GOOD.   NOT WATCHING DIET GOOD.  PHYSICALLY ACTIVE  FOR EXERCISE.  TAKING MEDICATIONS REGULARLY.         Prior to Admission medications    Medication Sig Start Date End Date Taking? Authorizing Provider   Cholecalciferol (VITAMIN D3) 50 MCG (2000 UT) CAPS Take 1 capsule by mouth daily   Yes Antoine Zayas MD   apixaban (ELIQUIS) 5 MG TABS tablet Take 1 tablet by mouth 2 times daily 3/14/24 9/10/24 Yes Christiano Hearn MD   rosuvastatin (CRESTOR) 5 MG tablet Take 1 tablet by mouth daily 3/14/24  Yes Christiano Hearn MD   Multiple Vitamins-Minerals (THERAPEUTIC MULTIVITAMIN-MINERALS) tablet Take 1 tablet by mouth daily   Yes Antoine Zayas MD     Social History     Socioeconomic History    Marital status: Single     Spouse name: None    Number of children: None    Years of education: None    Highest education level: None   Tobacco Use    Smoking status: Never    Smokeless tobacco: Never   Vaping Use    Vaping Use: Never used   Substance and Sexual Activity    Alcohol use: No    Drug use: No    Sexual activity: Yes     Partners: Male     Social Determinants of Health     Financial Resource Strain: Low Risk  (3/14/2024)    Overall Financial Resource Strain (CARDIA)     Difficulty of Paying Living Expenses: Not hard at all   Food Insecurity: No Food Insecurity (3/14/2024)    Hunger Vital Sign     Worried About Running Out of Food in the Last Year: Never true     Ran Out of Food in the Last Year: Never true   Transportation Needs: Unknown (3/14/2024)    PRAPARE - Transportation     Lack of Transportation (Non-Medical): No   Physical Activity:

## 2024-06-26 NOTE — PATIENT INSTRUCTIONS
LOW CARBOHYDRATE FOR  WEIGHT CONTROL.  LOW FAT DIET FOR CHOLESTEROL CONTROL.  TAKE CRESTOR 5 MG. NIGHTLY 3 TIMES WEEKLY FOR CHOLESTEROL CONTROL.  TAKE ELIQUIS 5 MG. 2 TIMES A DAY FOR RECURRENT PULMONARY EMBOLISM.  TAKE  VITAMIN D-3 5000 UNITS X 3 WEEKLY  FOR MAINTENANCES.  REGULAR WALKING ADVISED.  ADVISED WEIGHT REDUCTION.  MAMMOGRAM AS SCHEDULED.  KEEP NEXT APPOINTMENT IN 2 MONTHS.

## 2024-10-07 DIAGNOSIS — E78.00 PRIMARY HYPERCHOLESTEROLEMIA: ICD-10-CM

## 2024-10-07 DIAGNOSIS — E55.9 HYPOVITAMINOSIS D: ICD-10-CM

## 2024-10-08 LAB
ALBUMIN: 4.4 G/DL (ref 3.5–5.2)
ALP BLD-CCNC: 118 U/L (ref 35–104)
ALT SERPL-CCNC: 13 U/L (ref 0–32)
ANION GAP SERPL CALCULATED.3IONS-SCNC: 14 MMOL/L (ref 7–16)
AST SERPL-CCNC: 20 U/L (ref 0–31)
BILIRUB SERPL-MCNC: 0.4 MG/DL (ref 0–1.2)
BUN BLDV-MCNC: 15 MG/DL (ref 6–23)
CALCIUM SERPL-MCNC: 10.2 MG/DL (ref 8.6–10.2)
CHLORIDE BLD-SCNC: 104 MMOL/L (ref 98–107)
CHOLESTEROL, TOTAL: 171 MG/DL
CO2: 22 MMOL/L (ref 22–29)
CREAT SERPL-MCNC: 0.8 MG/DL (ref 0.5–1)
GFR, ESTIMATED: 79 ML/MIN/1.73M2
GLUCOSE BLD-MCNC: 88 MG/DL (ref 74–99)
HDLC SERPL-MCNC: 52 MG/DL
LDL CHOLESTEROL: 98 MG/DL
POTASSIUM SERPL-SCNC: 4.1 MMOL/L (ref 3.5–5)
SODIUM BLD-SCNC: 140 MMOL/L (ref 132–146)
TOTAL PROTEIN: 7.5 G/DL (ref 6.4–8.3)
TRIGL SERPL-MCNC: 105 MG/DL
VITAMIN D 25-HYDROXY: 28.8 NG/ML (ref 30–100)
VLDLC SERPL CALC-MCNC: 21 MG/DL

## 2024-10-08 NOTE — RESULT ENCOUNTER NOTE
VITAMIN D LEVEL LOW. TAKE VITAMIN D-3 2000 UNITS DAILY.   PLEASE ACKNOWLEDGE RECEIPT OF INFORMATION BY REPLYING THE MESSAGE. THANKS.

## 2024-10-11 ENCOUNTER — OFFICE VISIT (OUTPATIENT)
Dept: FAMILY MEDICINE CLINIC | Age: 64
End: 2024-10-11

## 2024-10-11 VITALS
WEIGHT: 212 LBS | SYSTOLIC BLOOD PRESSURE: 130 MMHG | OXYGEN SATURATION: 100 % | BODY MASS INDEX: 35.28 KG/M2 | DIASTOLIC BLOOD PRESSURE: 84 MMHG | HEART RATE: 78 BPM

## 2024-10-11 DIAGNOSIS — E78.00 PRIMARY HYPERCHOLESTEROLEMIA: Primary | ICD-10-CM

## 2024-10-11 DIAGNOSIS — J44.9 MILD CHRONIC OBSTRUCTIVE PULMONARY DISEASE (HCC): ICD-10-CM

## 2024-10-11 DIAGNOSIS — Z23 FLU VACCINE NEED: ICD-10-CM

## 2024-10-11 DIAGNOSIS — E55.9 HYPOVITAMINOSIS D: ICD-10-CM

## 2024-10-11 DIAGNOSIS — E66.811 OBESITY (BMI 30.0-34.9): ICD-10-CM

## 2024-10-11 DIAGNOSIS — I26.92 SADDLE EMBOLUS OF PULMONARY ARTERY WITHOUT ACUTE COR PULMONALE, UNSPECIFIED CHRONICITY (HCC): ICD-10-CM

## 2024-10-11 NOTE — PATIENT INSTRUCTIONS
LOW CARBOHYDRATE FOR  WEIGHT CONTROL.  LOW FAT DIET FOR CHOLESTEROL CONTROL.  TAKE CRESTOR 5 MG. NIGHTLY 3 TIMES WEEKLY FOR CHOLESTEROL CONTROL.  TAKE ELIQUIS 5 MG. 2 TIMES A DAY FOR RECURRENT PULMONARY EMBOLISM.  TAKE  VITAMIN D-3 5000 UNITS  DAILY FOR MAINTENANCES.  REGULAR WALKING ADVISED.  ADVISED WEIGHT REDUCTION.  MAMMOGRAM AS SCHEDULED IN 2 WEEKS.  INJECTION FLU VACCINE GIVEN TODAY. CALL FOR  ANY ADVERSE REACTION.   KEEP NEXT APPOINTMENT IN 1 MONTH FOR ANNUAL PHYSICAL EXAMINATION.

## 2024-10-11 NOTE — PROGRESS NOTES
OFFICE PROGRESS NOTE      SUBJECTIVE:        Patient ID:   Viviana Santana is a 64 y.o. female who presents for   Chief Complaint   Patient presents with    Cholesterol Problem    Flu Vaccine     Can she get lower dose           HPI:     RECHECK BP, CHOLESTEROL AND H/O PE.  REQUEST FLU VACCINE BUT LOWER DOSE. EXPLAINED POSSIBLE LESS EFFECTIVENESS.  MAMMOGRAM IS SCHEDULED IN 2 WEEKS.  MEDICATION REFILL.  FEELS GOOD.   WATCHING DIET GOOD.  WALKING FOR EXERCISE.  TAKING MEDICATIONS REGULARLY. NOT TAKING VITAMIN D REGULARLY. WILL TRY VITAMIN D 5000 UNITS DAILY TO BUILD UP THE LEVEL.        Prior to Admission medications    Medication Sig Start Date End Date Taking? Authorizing Provider   Cholecalciferol (VITAMIN D3) 50 MCG (2000 UT) CAPS Take 1 capsule by mouth daily   Yes Antoine Zayas MD   apixaban (ELIQUIS) 5 MG TABS tablet Take 1 tablet by mouth 2 times daily 3/14/24 10/11/24 Yes Christiano Hearn MD   rosuvastatin (CRESTOR) 5 MG tablet Take 1 tablet by mouth daily 3/14/24  Yes Christiano Hearn MD   Multiple Vitamins-Minerals (THERAPEUTIC MULTIVITAMIN-MINERALS) tablet Take 1 tablet by mouth daily   Yes Antoine Zayas MD     Social History     Socioeconomic History    Marital status: Single     Spouse name: None    Number of children: None    Years of education: None    Highest education level: None   Tobacco Use    Smoking status: Never    Smokeless tobacco: Never   Vaping Use    Vaping status: Never Used   Substance and Sexual Activity    Alcohol use: No    Drug use: No    Sexual activity: Yes     Partners: Male     Social Determinants of Health     Financial Resource Strain: Low Risk  (3/14/2024)    Overall Financial Resource Strain (CARDIA)     Difficulty of Paying Living Expenses: Not hard at all   Food Insecurity: No Food Insecurity (3/14/2024)    Hunger Vital Sign     Worried About Running Out of Food in the Last Year: Never true     Ran Out of Food in the

## 2024-10-14 ENCOUNTER — TELEPHONE (OUTPATIENT)
Dept: FAMILY MEDICINE CLINIC | Age: 64
End: 2024-10-14

## 2024-10-14 NOTE — TELEPHONE ENCOUNTER
----- Message from Bishop LAGOS sent at 10/14/2024 12:16 PM EDT -----  Regarding: [facility] Appointment Request  [facility] Appointment Request    Patient needs appointment for [facility] Appointment Type: Annual Visit.    Patient Requested Dates(s):       November 22, 2024 or November 20, 2024  Patient Requested Time:             Anytime after 10AM for 11/22/24 and for 11/20/24 is after 3PM  Provider Name:                           DR. Christiano Hearn MD    Reason for Appointment Request: Established Patient - Available appointments did not meet patient need  --------------------------------------------------------------------------------------------------------------------------    Relationship to Patient: Self     Call Back Information: OK to leave message on voicemail  Preferred Call Back Number:  [phone]

## 2024-10-28 ENCOUNTER — HOSPITAL ENCOUNTER (OUTPATIENT)
Dept: GENERAL RADIOLOGY | Age: 64
Discharge: HOME | End: 2024-10-30
Payer: MEDICARE

## 2024-10-28 DIAGNOSIS — Z12.31 SCREENING MAMMOGRAM FOR BREAST CANCER: ICD-10-CM

## 2024-10-28 PROCEDURE — 77063 BREAST TOMOSYNTHESIS BI: CPT

## 2024-11-26 ENCOUNTER — OFFICE VISIT (OUTPATIENT)
Dept: FAMILY MEDICINE CLINIC | Age: 64
End: 2024-11-26

## 2024-11-26 VITALS
OXYGEN SATURATION: 97 % | BODY MASS INDEX: 34.28 KG/M2 | WEIGHT: 206 LBS | DIASTOLIC BLOOD PRESSURE: 70 MMHG | SYSTOLIC BLOOD PRESSURE: 118 MMHG | HEART RATE: 96 BPM

## 2024-11-26 DIAGNOSIS — E55.9 HYPOVITAMINOSIS D: ICD-10-CM

## 2024-11-26 DIAGNOSIS — I26.92 SADDLE EMBOLUS OF PULMONARY ARTERY WITHOUT ACUTE COR PULMONALE, UNSPECIFIED CHRONICITY (HCC): ICD-10-CM

## 2024-11-26 DIAGNOSIS — J44.9 MILD CHRONIC OBSTRUCTIVE PULMONARY DISEASE (HCC): ICD-10-CM

## 2024-11-26 DIAGNOSIS — Z00.00 INITIAL MEDICARE ANNUAL WELLNESS VISIT: Primary | ICD-10-CM

## 2024-11-26 DIAGNOSIS — E66.811 OBESITY (BMI 30.0-34.9): ICD-10-CM

## 2024-11-26 DIAGNOSIS — E78.00 PRIMARY HYPERCHOLESTEROLEMIA: ICD-10-CM

## 2024-11-26 ASSESSMENT — PATIENT HEALTH QUESTIONNAIRE - PHQ9
1. LITTLE INTEREST OR PLEASURE IN DOING THINGS: NOT AT ALL
SUM OF ALL RESPONSES TO PHQ9 QUESTIONS 1 & 2: 0
SUM OF ALL RESPONSES TO PHQ QUESTIONS 1-9: 0
2. FEELING DOWN, DEPRESSED OR HOPELESS: NOT AT ALL

## 2024-11-26 ASSESSMENT — LIFESTYLE VARIABLES
HOW OFTEN DO YOU HAVE A DRINK CONTAINING ALCOHOL: NEVER
HOW MANY STANDARD DRINKS CONTAINING ALCOHOL DO YOU HAVE ON A TYPICAL DAY: PATIENT DOES NOT DRINK

## 2024-11-26 NOTE — PROGRESS NOTES
Medicare Annual Wellness Visit    Viviana Santana is here for Medicare AWV    Assessment & Plan   Initial Medicare annual wellness visit  Primary hypercholesterolemia  Mild chronic obstructive pulmonary disease (HCC)  Saddle embolus of pulmonary artery without acute cor pulmonale, unspecified chronicity (HCC)  Obesity (BMI 30.0-34.9)  Hypovitaminosis D    Recommendations for Preventive Services Due: see orders and patient instructions/AVS.  Recommended screening schedule for the next 5-10 years is provided to the patient in written form: see Patient Instructions/AVS.     Return in 3 months (on 2/26/2025) for  FOLLOW UP VISIT AND  Medicare Annual Wellness Visit in 1 year,.     Subjective   The following acute and/or chronic problems were also addressed today:  AS LISTED ABOVE    Patient's complete Health Risk Assessment and screening values have been reviewed and are found in Flowsheets. The following problems were reviewed today and where indicated follow up appointments were made and/or referrals ordered.    Positive Risk Factor Screenings with Interventions:               Poor Eating Habits/Diet:  Do you eat balanced/healthy meals regularly?: (!) No  Interventions:  Patient comments: HAS PROBLEM WITH CONTROLLING SWEETS BUT WORKING ON THE PROBLEM FIXING IT.  Patient declines any further evaluation or treatment    Abnormal BMI (obese):  Body mass index is 34.28 kg/m². (!) Abnormal  Interventions:  Patient comments: AS NOTED ABOVE  Patient declines any further evaluation or treatment                           Objective   Vitals:    11/26/24 1511   BP: 118/70   Pulse: 96   SpO2: 97%   Weight: 93.4 kg (206 lb)      Body mass index is 34.28 kg/m².      General Appearance: alert and oriented to person, place and time, well developed and well- nourished, in no acute distress  Skin: warm and dry, no rash or erythema  Head: normocephalic and atraumatic  Eyes: pupils equal, round, and reactive to light, extraocular eye

## 2024-11-26 NOTE — PATIENT INSTRUCTIONS
LOW CARBOHYDRATE FOR  WEIGHT CONTROL.  LOW FAT DIET FOR CHOLESTEROL CONTROL.  TAKE CRESTOR 5 MG. NIGHTLY 3 TIMES WEEKLY FOR CHOLESTEROL CONTROL.  TAKE ELIQUIS 5 MG. 2 TIMES A DAY FOR RECURRENT PULMONARY EMBOLISM.  TAKE  VITAMIN D-3 2000 UNITS  DAILY FOR MAINTENANCES.  REGULAR WALKING ADVISED.  ADVISED WEIGHT REDUCTION.   NEXT APPOINTMENT IN 3 MONTHS.       Eating Healthy Foods: Care Instructions  With every meal, you can make healthy food choices. Try to eat a variety of fruits, vegetables, whole grains, lean proteins, and low-fat dairy products. This can help you get the right balance of nutrients, including vitamins and minerals. Small changes add up over time. You can start by adding one healthy food to your meals each day.    Try to make half your plate fruits and vegetables, one-fourth whole grains, and one-fourth lean proteins. Try including dairy with your meals.   Eat more fruits and vegetables. Try to have them with most meals and snacks.   Foods for healthy eating        Fruits   These can be fresh, frozen, canned, or dried.  Try to choose whole fruit rather than fruit juice.  Eat a variety of colors.        Vegetables   These can be fresh, frozen, canned, or dried.  Beans, peas, and lentils count too.        Whole grains   Choose whole-grain breads, cereals, and noodles.  Try brown rice.        Lean proteins   These can include lean meat, poultry, fish, and eggs.  You can also have tofu, beans, peas, lentils, nuts, and seeds.        Dairy   Try milk, yogurt, and cheese.  Choose low-fat or fat-free when you can.  If you need to, use lactose-free milk or fortified plant-based milk products, such as soy milk.        Water   Drink water when you're thirsty.  Limit sugar-sweetened drinks, including soda, fruit drinks, and sports drinks.  Where can you learn more?  Go to https://www.healthwise.net/patientEd and enter T756 to learn more about \"Eating Healthy Foods: Care Instructions.\"  Current as of: September

## 2025-09-18 ENCOUNTER — APPOINTMENT (OUTPATIENT)
Dept: GASTROENTEROLOGY | Facility: CLINIC | Age: 65
End: 2025-09-18
Payer: MEDICARE

## (undated) DEVICE — 6 X 9  1.75MIL 4-WALL LABGUARD: Brand: MINIGRIP COMMERCIAL LLC

## (undated) DEVICE — TUBING, SUCTION, 1/4" X 10', STRAIGHT: Brand: MEDLINE

## (undated) DEVICE — Device: Brand: DEFENDO VALVE AND CONNECTOR KIT

## (undated) DEVICE — KENDALL 450 SERIES MONITORING FOAM ELECTRODE - RECTANGULAR SHAPE ( 3/PK): Brand: KENDALL

## (undated) DEVICE — GOWN ISOLATN REG YEL M WT MULTIPLY SIDETIE LEV 2

## (undated) DEVICE — LUBRICANT SURG JELLY ST BACTER TUBE 4.25OZ

## (undated) DEVICE — SPONGE GZ 4IN 4IN 4 PLY N WVN AVANT

## (undated) DEVICE — YANKAUER,BULB TIP,W/O VENT,RIGID,STERILE: Brand: MEDLINE

## (undated) DEVICE — KIT BEDSIDE REVITAL OX 500ML

## (undated) DEVICE — CONTAINER SPEC COLL 960ML POLYPR TRIANG GRAD INTAKE/OUTPUT

## (undated) DEVICE — BLOCK BITE 60FR CAREGUARD

## (undated) DEVICE — MASK,FACE,MAXFLUIDPROTECT,SHIELD/ERLPS: Brand: MEDLINE

## (undated) DEVICE — CONTAINER SPEC 480ML CLR POLYSTYR 10% NEUT BUFF FRMLN ZN

## (undated) DEVICE — FORCEPS BX L240CM JAW DIA2.8MM L CAP W/ NDL MIC MESH TOOTH